# Patient Record
Sex: FEMALE | NOT HISPANIC OR LATINO | Employment: FULL TIME | URBAN - METROPOLITAN AREA
[De-identification: names, ages, dates, MRNs, and addresses within clinical notes are randomized per-mention and may not be internally consistent; named-entity substitution may affect disease eponyms.]

---

## 2022-09-20 ENCOUNTER — PATIENT OUTREACH (OUTPATIENT)
Dept: ONCOLOGY | Facility: CLINIC | Age: 67
End: 2022-09-20

## 2022-09-20 ENCOUNTER — TRANSCRIBE ORDERS (OUTPATIENT)
Dept: OTHER | Age: 67
End: 2022-09-20

## 2022-09-20 DIAGNOSIS — C55 MALIGNANT NEOPLASM OF UTERUS (H): Primary | ICD-10-CM

## 2022-09-20 NOTE — PROGRESS NOTES
"New Patient Hematology / Oncology Nurse Navigator Note     Referral Date: 9/20/22    Referring provider:   Tamy Syed MD    [address] [facility] [address]    [address]    [place] [address]    Phone: [phone]    Fax: [phone]      Referring Clinic/Organization: [facility]      Referred to: GynOnc    Requested provider (if applicable): First available - did not specify     Evaluation for : Endometrial cancer FIGO grade 1     Clinical History (per Nurse review of records provided):      9/19 Office Visit OBGYN:  \"A/p; post op check, new diagnosis of endometrial adenocarcinoma  Healing is going well, continue lifting precautions but may be more active as time goes on  Referral placed for GYN Oncology. Care involves removal of cervix and ovaries for complete staging and treatment. This would invariably involve removal of her mesh. Timing of surgery and risk for recurrence is difficult to tell given her unique situation, but data may be available that I am unaware of regarding this.   Follow up as needed. \" -- BOOKMARKED     9/2 path showing:  Final Diagnosis     A. CERVIX, BIOPSY  --INFLAMED ENDOCERVICAL POLYP     B. UTERUS AND FALLOPIAN TUBES, SUPRACERVICAL HYSTERECTOMY AND BILATERAL SALPINGECTOMY  --ENDOMETRIOID ADENOCARCINOMA, FIGO GRADE 1, INVADING 0.2 CM INTO A 1.1 CM MYOMETRIUM (LESS THAN 50%)  --FALLOPIAN TUBES WITH NO DIAGNOSTIC ABNORMALITY  --BENIGN BILATERAL FALLOPIAN TUBES  --SEE COMMENT   -- BOOKMARKED    2/26/20 PAP/HPV -- BOOKMARKED    Clinical Assessment / Barriers to Care (Per Nurse):  Pt lives in [facility]    Records Location: Care Everywhere     Records Needed:   Imaging, path from [facility]    Additional testing needed prior to consult:   N/A    Referral updates and Plan:   OUTGOING CALL to pt:  Introduced my role as nurse navigator with [facility] Hematology/Oncology dept and that we have recd the referral for dx of Endometrial Cancer from Dr Syed  Pt confirms they are aware of " the referral and ready to schedule  Provided contact information if future questions arise.     -Transferred pt to NPS line [phone] to schedule appt per scheduling instructions.    Hold 9/26 Dr. Richards 8AM @     Carmencita Pate, BARRYN, RN, PHN, OCN  Hematology/Oncology Nurse Navigator  [facility]  [phone]

## 2022-09-22 NOTE — PROGRESS NOTES
RECORDS STATUS - ALL OTHER DIAGNOSIS    Malignant neoplasm of uterus   RECORDS RECEIVED FROM: [facility]   DATE RECEIVED: 9/26/2022     Action    Action Taken 9/22/2022 5:42pm [facility]     I called [facility]   Ph: [phone]     9/23/2022 8:30AM [facility]   I called [facility]   Ph: [phone] Fax: [phone]      NOTES STATUS DETAILS   OFFICE NOTE from referring provider Complete [facility]   ref Dr. Syed   DISCHARGE SUMMARY from hospital     DISCHARGE REPORT from the ER     OPERATIVE REPORT Complete See biopsy Report in /[facility]   CLINICAL TRIAL TREATMENTS TO DATE     LABS     PATHOLOGY REPORTS Requested- [facility]   [employer] Tracking Number: [id number]  9/2/2022   Case [id number]  A. CERVIX, BIOPSY  --INFLAMED ENDOCERVICAL POLYP     B. UTERUS AND FALLOPIAN TUBES, SUPRACERVICAL HYSTERECTOMY AND BILATERAL SALPINGECTOMY  --ENDOMETRIOID ADENOCARCINOMA   ANYTHING RELATED TO DIAGNOSIS Complete Labs last updated on 9/3/2022   GENONOMIC TESTING     TYPE:     IMAGING (NEED IMAGES & REPORT)     CT SCANS     MRI     MAMMO     [facility] 12/2/2015     US transvaginal 12/2/2015   PET

## 2022-09-26 ENCOUNTER — ONCOLOGY VISIT (OUTPATIENT)
Dept: ONCOLOGY | Facility: CLINIC | Age: 67
End: 2022-09-26
Attending: OBSTETRICS & GYNECOLOGY
Payer: COMMERCIAL

## 2022-09-26 ENCOUNTER — PATIENT OUTREACH (OUTPATIENT)
Dept: ONCOLOGY | Facility: CLINIC | Age: 67
End: 2022-09-26

## 2022-09-26 ENCOUNTER — PRE VISIT (OUTPATIENT)
Dept: ONCOLOGY | Facility: CLINIC | Age: 67
End: 2022-09-26

## 2022-09-26 VITALS
HEART RATE: 77 BPM | SYSTOLIC BLOOD PRESSURE: 141 MMHG | DIASTOLIC BLOOD PRESSURE: 87 MMHG | HEIGHT: 65 IN | OXYGEN SATURATION: 100 % | BODY MASS INDEX: 25.33 KG/M2 | WEIGHT: 152 LBS

## 2022-09-26 DIAGNOSIS — C54.1 ENDOMETRIAL CANCER (H): Primary | ICD-10-CM

## 2022-09-26 PROBLEM — N81.4 CYSTOCELE WITH PROLAPSE: Status: ACTIVE | Noted: 2022-04-29

## 2022-09-26 PROBLEM — M19.90 ARTHRITIS: Status: ACTIVE | Noted: 2018-02-05

## 2022-09-26 PROBLEM — N81.9 PELVIC PROLAPSE: Status: ACTIVE | Noted: 2022-09-02

## 2022-09-26 PROBLEM — H25.21 MORGAGNIAN AGE-RELATED CATARACT OF RIGHT EYE: Status: ACTIVE | Noted: 2021-01-26

## 2022-09-26 PROBLEM — N60.19 DIFFUSE CYSTIC MASTOPATHY: Status: ACTIVE | Noted: 2022-09-26

## 2022-09-26 PROBLEM — H25.13 AGE-RELATED NUCLEAR CATARACT OF BOTH EYES: Status: ACTIVE | Noted: 2021-02-11

## 2022-09-26 PROBLEM — E78.5 HYPERLIPIDEMIA: Status: ACTIVE | Noted: 2022-09-26

## 2022-09-26 PROCEDURE — 99205 OFFICE O/P NEW HI 60 MIN: CPT | Performed by: OBSTETRICS & GYNECOLOGY

## 2022-09-26 RX ORDER — ASCORBIC ACID 100 MG
1 TABLET,CHEWABLE ORAL DAILY
COMMUNITY

## 2022-09-26 RX ORDER — ROSUVASTATIN CALCIUM 5 MG/1
5 TABLET, COATED ORAL
COMMUNITY
Start: 2022-02-21 | End: 2023-02-21

## 2022-09-26 RX ORDER — HEPARIN SODIUM 10000 [USP'U]/ML
5000 INJECTION, SOLUTION INTRAVENOUS; SUBCUTANEOUS
Status: CANCELLED | OUTPATIENT
Start: 2022-09-26

## 2022-09-26 RX ORDER — METRONIDAZOLE 500 MG/100ML
500 INJECTION, SOLUTION INTRAVENOUS
Status: CANCELLED | OUTPATIENT
Start: 2022-09-26

## 2022-09-26 RX ORDER — IBUPROFEN 800 MG/1
800 TABLET, FILM COATED ORAL EVERY 8 HOURS
COMMUNITY
Start: 2022-09-03

## 2022-09-26 RX ORDER — ASCORBIC ACID 500 MG
1000 TABLET ORAL
COMMUNITY

## 2022-09-26 RX ORDER — ACETAMINOPHEN 500 MG
1000 TABLET ORAL
COMMUNITY
Start: 2022-09-03

## 2022-09-26 RX ORDER — CHLORAL HYDRATE 500 MG
1 CAPSULE ORAL DAILY
COMMUNITY

## 2022-09-26 RX ORDER — AMOXICILLIN 250 MG
2 CAPSULE ORAL
COMMUNITY
Start: 2022-09-04 | End: 2022-10-26

## 2022-09-26 ASSESSMENT — PAIN SCALES - GENERAL: PAINLEVEL: NO PAIN (0)

## 2022-09-26 NOTE — PROGRESS NOTES
[facility], Gynecologic Oncology:  Pre-op Education Note    Relevant Diagnosis:  Endometrial cancer    Procedure:  Robotic-assisted laparoscopic trachelectomy, bilateral oophorectomy; likely combo case with UroGyn per Dr. Patel).    Procedure Date: TBD    Person(s) involved in teaching:  Patient and     Education was completed: in person.    Motivation Level:    Asks Questions:   Yes  Eager to Learn:  Yes  Cooperative:  Yes  Receptive (willing/able to accept information):  Yes  Comments:  None    Patient demonstrates understanding of the following:  Reason for the appointment, diagnosis and treatment plan:  Yes  Knowledge of proper use of medications and conditions for which they are ordered (with special attention to potential side effects or drug interactions):  Yes  Which situations necessitate calling provider and whom to contact:  Yes    Teaching Concerns:  No    Education/Instructional Materials Used/Given:     Before Your Surgery Booklet ([place])  Showering Before Surgery, CHG prep provided  Home Care After Gynecologic Surgery  [facility]  Accommodations Brochure   Phone numbers for [facility] and After-Hours Line    Pre-op tests:     EKG: Not needed, per Dr. Patel.    Labs: Not needed, per Dr. Patel.    Chest X-Ray: Not needed, per Dr. Patel.    COVID-19 Testing: Patient will plan to complete rapid test (antigen test) at home 1-2 days prior to surgery, and was given the instructions for this.    Other: N/A    Pre-op appointment: Dr. Patel will complete patient's pre-op H&P exam.    Post-op appointment: ~1-2 weeks after surgery; appointment will be scheduled once a surgery date has been confirmed.    Time spent teaching with patient:  25 minutes    Hysterectomy Acknowledgement Statement form signed today: N/A - not required.    E-Consent for surgery signed today: Yes.    E-Consent for possible blood transfusion signed today:  Yes.    Surgery scheduling team at [facility] was notified, and will be contacting patient directly to schedule her surgery.    Fox Zhang, RN, BSN, OCN  RN Care Coordinator - Oncology  [facility]

## 2022-09-26 NOTE — NURSING NOTE
"Oncology Rooming Note    September 26, 2022 8:04 AM   Carole Grover is a 67 year old female who presents for:    Chief Complaint   Patient presents with     Oncology Clinic Visit     New Malignant Neoplasm of Uterus     Initial Vitals: BP (!) 141/87 (BP Location: Right arm, Patient Position: Chair, Cuff Size: Adult Regular)   Pulse 77   Ht 1.651 m (5' 5\")   Wt 68.9 kg (152 lb)   SpO2 100%   BMI 25.29 kg/m   Estimated body mass index is 25.29 kg/m  as calculated from the following:    Height as of this encounter: 1.651 m (5' 5\").    Weight as of this encounter: 68.9 kg (152 lb). Body surface area is 1.78 meters squared.  No Pain (0) Comment: Data Unavailable   No LMP recorded.  Allergies reviewed: Yes  Medications reviewed: Yes    Medications: Medication refills not needed today.  Pharmacy name entered into EPIC: [id number] - [place], [place] - [address] [place] [address]    Clinical concerns: New Patient     Dr. Patel was notified.      Selma Higgins CMA              "

## 2022-09-26 NOTE — PROGRESS NOTES
Gynecologic Oncology Clinic - New Patient    Referring provider:    Tamy Syed  [facility]  [address] [place] [address]    Patient: Carole Grover  : 1955    Date of Visit: Sep 26, 2022     Reason for visit: endometrial cancer    History of Present Illness:  Carole Grover is a 67 year old post-menopausal female who underwent robotic supracervical hysterectomy, salpingectomy with sacral colpopexy with incidental finding of endometrial cancer on final pathology on 2022. Specimen was removed intact.    No prior history of abnormal uterine bleeding. Body mass index is 25.29 kg/m . Family history notable for brother with bladder cancer and sister with very early onset breast cancer (20s).        Review of Systems:  As per HPI, otherwise non-contributory.     OB/Gynecologic History:  ,  x 3    Pap smear: no history of abnormal    Past Medical History:   Diagnosis Date     High cholesterol        Past Surgical History:   Procedure Laterality Date     APPENDECTOMY       CATARACT EXTRACTION Bilateral         Social History     Tobacco Use     Smoking status: Never Smoker     Smokeless tobacco: Never Used   Substance Use Topics     Alcohol use: Yes     Drug use: Never        Family History   Problem Relation Age of Onset     Cervical Cancer Sister      Breast Cancer Sister 20     Bladder Cancer Brother        Current Outpatient Medications   Medication Sig Dispense Refill     acetaminophen (TYLENOL) 500 MG tablet Take 1,000 mg by mouth       fish oil-omega-3 fatty acids 1000 MG capsule Take 1 capsule by mouth daily       ibuprofen (ADVIL/MOTRIN) 800 MG tablet Take 800 mg by mouth every 8 hours       Multiple Vitamin (QUINTABS) TABS Take 1 tablet by mouth daily       rosuvastatin (CRESTOR) 5 MG tablet Take 5 mg by mouth       senna-docusate (SENOKOT-S/PERICOLACE) 8.6-50 MG tablet Take 2 tablets by mouth       UNABLE TO FIND Take 1 capsule by mouth Osteomatrix       vitamin C  "(ASCORBIC ACID) 500 MG tablet Take 1,000 mg by mouth       vitamin D3 (CHOLECALCIFEROL) 125 MCG (5000 UT) tablet Take 5,000 Units by mouth          Allergies   Allergen Reactions     Penicillins Hives     Keflex [Cephalexin] Rash         Physical Exam:   BP (!) 141/87 (BP Location: Right arm, Patient Position: Chair, Cuff Size: Adult Regular)   Pulse 77   Ht 1.651 m (5' 5\")   Wt 68.9 kg (152 lb)   SpO2 100%   BMI 25.29 kg/m    General appearance: Alert and oriented, no acute distress, well groomed  CV: regular rate, regular rhythm  Resp: Lungs clear to auscultation bilaterally. Normal respiratory effort.  Skin: no skin changes or lesions      Labs/Pathology:   Reviewed her labs from 9/3/2022 Hgb and type and screen  8/29/22: CBC, BMP no concerns; EKG NSR    Final Diagnosis     A. CERVIX, BIOPSY  --INFLAMED ENDOCERVICAL POLYP     B. UTERUS AND FALLOPIAN TUBES, SUPRACERVICAL HYSTERECTOMY AND BILATERAL SALPINGECTOMY  --ENDOMETRIOID ADENOCARCINOMA, FIGO GRADE 1, INVADING 0.2 CM INTO A 1.1 CM MYOMETRIUM (LESS THAN 50%)  --FALLOPIAN TUBES WITH NO DIAGNOSTIC ABNORMALITY  --BENIGN BILATERAL FALLOPIAN TUBES  --SEE COMMENT     [id number]   Electronically signed by Lyla Vazquez MD on 9/13/2022 at  2:37 PM   Comment     This case was seen in consultation with [employer] (Case Duran M.D., Ph.D.) and the diagnosis reflects the consultant's interpretation.     Synoptic Checklist  ENDOMETRIUM   ENDOMETRIUM: HYSTERECTOMY - All Specimens   8th Edition - Protocol posted: 6/22/2022     SPECIMEN      Procedure:    Supracervical hysterectomy     TUMOR      Tumor Site:    Endometrium      Histologic Type:    Endometrioid carcinoma, NOS      Histologic Grade:    FIGO grade 1      Myometrial Invasion:    Present        Depth of Myometrial Invasion:    2 mm        Myometrial Thickness:    11 mm        Percentage of Myometrial Invasion:    Estimated to be less than 50%      Uterine Serosa Involvement: " "   Not identified      Cervical Stromal Involvement:    Not identified      Other Tissue / Organ Involvement:    Not identified      Peritoneal / Ascitic Fluid:    Not submitted / unknown      Lymphovascular Invasion (LVI):    Not identified     MARGINS      Margin Status:    All margins negative for invasive carcinoma     REGIONAL LYMPH NODES      Regional Lymph Node Status:    Not applicable (no regional lymph nodes submitted or found)     DISTANT METASTASIS      Distant Site(s) Involved:    Not applicable     PATHOLOGIC STAGE CLASSIFICATION (pTNM, AJCC 8th Edition)      Reporting of pT, pN, and (when applicable) pM categories is based on information available to the pathologist at the time the report is issued. As per the AJCC (Chapter 1, 8th Ed.) it is the managing physician's responsibility to establish the final pathologic stage based upon all pertinent information, including but potentially not limited to this pathology report.      TNM Descriptors:    Not applicable      pT Category:    pT1a      Regional Lymph Nodes Modifier:    Not applicable      pN Category:    pN not assigned (no nodes submitted or found)      pM Category:    Not applicable - pM cannot be determined from the submitted specimen(s)     FIGO STAGE      FIGO Stage:    IA    Clinical Information     Pre-op diagnosis:  Cystocele with prolapse [N81.4]   Gross Description        A. The specimen in a container labeled \"cervix, polyp\" consists of a 1.2 x 0.5 x 0.4 cm tan-pink soft tissue fragment. It is submitted in A1.          B. The specimen in a container labeled \"uterus and fallopian tubes, bilateral\" consists of a 29 gram supracervical hysterectomy specimen measuring 5.6 cm cornu to cornu by 3.2 cm fundus to resection margin by 1.8 cm anterior to posterior. The serosa is tan-pink, smooth and glistening. The endometrial cavity is 2.4 x 2.4 cm and has tan-pink focally hemorrhagic endometrium with a 0.5 x 0.4 x 0.3 cm polyp. The myometrium is " tan-pink and mildly trabeculated with a thickness of 1.1 cm. No discrete lesions or masses are identified within the uterus. Separately received within the container are two fimbriated fallopian tubes measuring 5.5 cm and 5.9 cm in length by 0.5 cm each in diameter. The external surface of both fallopian tubes is tan-pink to purple, smooth and glistening. Sectioning the fallopian tubes reveals tan-pink cut surfaces with a stellate lumen. No discrete lesions or masses are identified. Representative sections are submitted: B1, margin to include endometrial polyp; B2, full thickness endomyometrium; B3, one fallopian tube to include entire fimbriae; B4, other fallopian tube to include entire fimbriae, B5-7 remainder of endometrium. (KS)             Imaging:   n/a      Assessment:  Carole Grover is a 67 year old post-menopausal female s/p robotic supracervical hysterectomy, salpingectomy with sacral colpopexy with incidental finding of a [id number] Grade 1 endometrial cancer on final pathology on 9/2/2022. No MMR testing has been completed.    Plan:   1. Endometrial cancer with cervix and ovaries in situ: we reviewed her pathology which shows an apparent early stage low grade tumor incidentally noted on pathology. We discussed the concern with cervix in situ that there could be some endometrial tissue remaining. Retrospective studies have pretty consistently shown a decreased cancer specific survival in patients who underwent supracervical hysterectomy as compared to matched controls who underwent total hysterectomy. Given this data, I would recommend completion surgery to include removal of the cervix (trachelectomy) with bilateral oophorectomy. The tumor in her uterus did not meet criteria for lymph node evaluation based upon Oh criteria, thus I feel the risk of occult lymphatic metastases is low and I would not recommend lymphadenectomy. We discussed that most likely the remaining cervix and ovaries do not contain  cancer, but the recommendation for removal is to both confirm this and also to decrease the chance of cancer returning in the future, particularly in the case of the cervix as the distinction between the cervix and the uterus is nebulous and thus it is common for endometrial tissue to remain when the cervix is still in situ.   2. Genetics: We will need to request MMR testing on her prior hysterectomy specimen from [facility]. She does not have the typical risk factors for an endometrial cancer diagnosis. No strong family history of Walsh associated cancers, although she does have a brother with h/o bladder cancer  3. History of apical/pelvic organ prolapse: we discussed that removal of the cervix unfortuately will necessitate disruption/removal of at least a portion of the mesh used for her repair and that would almost certainly result in re-occurrence of her prolapse. Thus I would recommend she connect with one of our Urology colleagues to discuss options for resuspension of the vaginal apex at the conclusion of the case.   4. I will plan to book her case robotically if possible given the risk of challenging re-excision of the mesh. I also strongly recommend we wait 6 weeks at a minimum prior to pursuing her next surgery as this decreases the inflammation and scar tissue from her prior surgery thus decreasing the risks of complications.  5. We reviewed the risks/benefits of this procedure. Alternatives would be observation with sampling of the remaining cervix at regular intervals. Technically radiation to the residual cervix is also an option as that has been shown to approximate total hysterectomy recurrence rates, however, in her case I think the risks of surgery are lower than the risks of radiation thus I would recommend that over radiation as an option.   6. I have sent a message to Dr. Hanks and her team. We will attempt to proceed with surgery in the next month.  7. Pre-op: She underwent pre-op exam prior to  hysterectomy and she has had no significant changes to her health status save for this diagnosis. Thus I do not see the utility in additional pre-op appointment. I will update the H&P day of surgery if needed based upon surgical timing.     I spent a total of 75 minutes on the care of Carole Grover on the day of service including 55 minutes face to face time and the remainder in chart review, care coordination, and documentation on the day of service. I discussed her case with Dr. Hanks in Urology. I reviewed the documention from Dr. Syed and from Urology.    Hayden Patel MD    Division of Gynecologic Oncology  Department of Ob/Gyn and Women's Health  [facility]

## 2022-09-28 NOTE — TELEPHONE ENCOUNTER
MEDICAL RECORDS REQUEST   [facility]  Urology Clinic  [address] [place] [address]  PHONE: [phone]  Fax: [phone]        FUTURE VISIT INFORMATION                                                   Carole Grover, : 1955 scheduled for future visit at [facility]    APPOINTMENT INFORMATION:    Date: 2022    Provider:  Tiffani Hanks MD    Reason for Visit/Diagnosis: Discuss surgery, combo surgery Dr Patel      RECORDS REQUESTED FOR VISIT                                                     NOTES  STATUS/DETAILS   OFFICE NOTE from other specialist  yes, 2022 -- Hayden Patel MD @ [facility]    2022, 2022 -- Dr Tamy Syed @  [facility]    2022 -- Anna Jaramillo MD @ [facility]   OPERATIVE REPORT  yes, 2022 - Madhuri Olson MD @ [facility]  2022 -- Tamy Syed MD @ [facility]   MEDICATION LIST  yes   LABS     URINALYSIS (UA)  no   IMAGING (IMAGES & REPORT)  no     PRE-VISIT CHECKLIST      Record collection complete Yes   Appointment appropriately scheduled           (right time/right provider) Yes   Joint diagnostic appointment coordinated correctly          (ensure right order & amount of time) Yes   MyChart activation Yes   Questionnaire complete If no, please explain pending

## 2022-09-29 ENCOUNTER — VIRTUAL VISIT (OUTPATIENT)
Dept: UROLOGY | Facility: CLINIC | Age: 67
End: 2022-09-29
Payer: COMMERCIAL

## 2022-09-29 ENCOUNTER — PRE VISIT (OUTPATIENT)
Dept: UROLOGY | Facility: CLINIC | Age: 67
End: 2022-09-29

## 2022-09-29 DIAGNOSIS — C54.1 ENDOMETRIAL CANCER (H): ICD-10-CM

## 2022-09-29 DIAGNOSIS — Z98.890 HISTORY OF SACROCOLPOPEXY: Primary | ICD-10-CM

## 2022-09-29 PROCEDURE — 99204 OFFICE O/P NEW MOD 45 MIN: CPT | Mod: GT | Performed by: UROLOGY

## 2022-09-29 NOTE — PATIENT INSTRUCTIONS
[facility] RN Care Coordinator [phone]    Surgery scheduling [phone]    It was a pleasure meeting with you today.  Thank you for allowing me and my team the privilege of caring for you today.  YOU are the reason we are here, and I truly hope we provided you with the excellent service you deserve.  Please let us know if there is anything else we can do for you so that we can be sure you are leaving completely satisfied with your care experience.

## 2022-09-29 NOTE — Clinical Note
Surgery with Maurice, patient needs 4 week postop in person (can be with SHANICE).  Also needs bowel prep prior to surgery

## 2022-09-29 NOTE — PROGRESS NOTES
September 29, 2022    Referring Provider: Hayden Patel MD  [address] [place] [address]    Primary Care Provider: Anna Jaramillo    Assessment & Plan     History of sacrocolpopexy  Discussed with patient that we would have to remove some of the mesh with the cervix.  We discussed that if the surgery went really well without any complications that I could plan on re-suspending the vaginal cuff with mesh.     We discussed that I do not plan on removing all of the existing mesh but that if possible would plan on adding a new piece of mesh to the vagina and then attaching this to the mesh that is in situ.  We discussed if there were any complications, specifically injury to bowel or the urinary tract that I would NOT place mesh and instead do a uterosacral suspension with the understanding that if needed we may be able to return for another surgery.      We also discussed that a trachelectomy after sacrocolpopexy can be challenging with increased risks for injury to bowel or urinary tract so I recommend a preoperative bowel prep and that we place temporary ureteral stents at the beginning of the case to assist us in identification of the ureters    Endometrial cancer (H)  She is going to be undergoing trachelctomy and BSO.  Based on the final pathology given the likelihood of needing pelvic radiation in the future is extremely low, will consider revising the sacrocolpopexy at the time of her cancer surgery    We discussed that the risks to the procedure include but not limited to cardiovascular risks of anesthesia, nerve injury, bleeding, infection, injury to adjacent organs including bowel, bladder, and blood vessels, conversion to open procedure, de abhishek or worsening stress incontinence or urge incontinence, need for post-operative waddell catheter, need for further procedures including for mesh extrusion or erosion.  Patient expressed understanding and agreeable to proceed with CYSTOSCOPY WITH  BILATERAL TEMPORARY URETERAL STENTS, ROBOTIC ASSISTED LAPAROSCOPIC SACRCOCOLPOPEXY MESH REVISION VERSUS REMOVAL, POSSIBLE UTEROSACRAL LIGAMENT SUSPENSION, POSSIBLE OPEN    Discussed with patient that this case is tentatively scheduled for 10/28    22 minutes were spent on this day of the encounter in reviewing the EMR including reviewing Dr Syed's notes, Dr Olson's notes, Dr Richards's note, direct patient care including surgical counseling, coordination of care and documentation    Tiffani Hanks MD MPH  (she/her/hers)   of Urology  [facility]      HPI:  Carole Grover is a 67 year old female who presents for evaluation of her pelvic floor symptoms.  She underwent a supracervical hysterectomy and sacrocolpopexy in 9/2/22 at [facility] with pathology returning with grade 1 Endometroid adenocarcinoma.  Operative notes from Dr Syed (OBGYN) and Dr Olson (Urology) reviewed in CareEverywhere from 9/2/22.  She was thus referred to Dr Richards who is planning on definitive surgery for the cancer (trachelectomy and BSO) and has asked patient to see me to discuss the sacrocolpopexy (Dr Richards's note from 9/26/22 reviewed)    Patient has had not issues from the sacrocolpoexy.  Denies gross hematuria, vaginal bleeding, UTIs, urinary incontinence, constipation, or pain.    Past Medical History:   Diagnosis Date     High cholesterol        Past Surgical History:   Procedure Laterality Date     APPENDECTOMY       CATARACT EXTRACTION Bilateral      Social History     Socioeconomic History     Marital status:      Spouse name: Not on file     Number of children: Not on file     Years of education: Not on file     Highest education level: Not on file   Occupational History     Not on file   Tobacco Use     Smoking status: Never Smoker     Smokeless tobacco: Never Used   Substance and Sexual Activity     Alcohol use: Yes     Drug use: Never     Sexual activity: Not on file   Other Topics Concern      Not on file   Social History Narrative     Not on file     Social Determinants of Health     Financial Resource Strain: Not on file   Food Insecurity: Not on file   Transportation Needs: Not on file   Physical Activity: Not on file   Stress: Not on file   Social Connections: Not on file   Intimate Partner Violence: Not on file   Housing Stability: Not on file       Family History   Problem Relation Age of Onset     Cervical Cancer Sister      Breast Cancer Sister 20     Bladder Cancer Brother        ROS    Allergies   Allergen Reactions     Penicillins Hives     Keflex [Cephalexin] Rash       Current Outpatient Medications   Medication     acetaminophen (TYLENOL) 500 MG tablet     rosuvastatin (CRESTOR) 5 MG tablet     fish oil-omega-3 fatty acids 1000 MG capsule     ibuprofen (ADVIL/MOTRIN) 800 MG tablet     Multiple Vitamin (QUINTABS) TABS     senna-docusate (SENOKOT-S/PERICOLACE) 8.6-50 MG tablet     UNABLE TO FIND     vitamin C (ASCORBIC ACID) 500 MG tablet     vitamin D3 (CHOLECALCIFEROL) 125 MCG (5000 UT) tablet     No current facility-administered medications for this visit.   There were no vitals taken for this visit.  GENERAL: healthy, alert and no distress  EYES: Eyes grossly normal to inspection, conjunctivae and sclerae normal  HENT: normal cephalic/atraumatic.  External ears, nose and mouth without ulcers or lesions.  RESP: no audible wheeze, cough, or visible cyanosis.  No visible retractions or increased work of breathing.  Able to speak fully in complete sentences.  NEURO: Cranial nerves grossly intact, mentation intact and speech normal  PSYCH: mentation appears normal, affect normal/bright, judgement and insight intact, normal speech and appearance well-groomed    CC  Patient Care Team:  Anna Jaramillo as PCP - General (Family Medicine)  Hayden Patel MD as MD (Gynecologic Oncology)  Fox Zhang, RN as Specialty Care Coordinator (Hematology & Oncology)  HAYDEN PATEL

## 2022-09-29 NOTE — NURSING NOTE
Patient denies any changes since echeck-in regarding medication and allergies.Patient also states all information entered during echeck-in remains accurate.    Patient states they are in [place] for today's virtual visit.     Alona Quezada, Virtual Visit [facility]

## 2022-09-29 NOTE — PROGRESS NOTES
Elzbieta is a 67 year old who is being evaluated via a billable video visit.      How would you like to obtain your AVS? Group [facility]be Ingenicahart  If the video visit is dropped, the invitation should be resent by: Text to cell phone: [phone]  Will anyone else be joining your video visit? No        Video-Visit Details    Video Start Time: 12:53 PM    Type of service:  Video Visit    Video End Time:1:08 PM    Originating Location (pt. Location): Home    Distant Location (provider location):  [facility] UROLOGY [facility]     Platform used for Video Visit: Rayku

## 2022-09-30 NOTE — TELEPHONE ENCOUNTER
Records received 09/30/22    Facility  [facility] LABORATORY SERVICES - [facility]  [address]   [place] [address]  Phone: [phone]   Outcome Received pathology - sent for consult   9/2/22 [id number]

## 2022-10-03 ENCOUNTER — LAB REQUISITION (OUTPATIENT)
Dept: LAB | Facility: CLINIC | Age: 67
End: 2022-10-03
Payer: COMMERCIAL

## 2022-10-03 ENCOUNTER — HEALTH MAINTENANCE LETTER (OUTPATIENT)
Age: 67
End: 2022-10-03

## 2022-10-03 PROCEDURE — 88321 CONSLTJ&REPRT SLD PREP ELSWR: CPT | Mod: GC | Performed by: PATHOLOGY

## 2022-10-03 PROCEDURE — 88341 IMHCHEM/IMCYTCHM EA ADD ANTB: CPT | Mod: 26 | Performed by: PATHOLOGY

## 2022-10-03 PROCEDURE — 88342 IMHCHEM/IMCYTCHM 1ST ANTB: CPT | Mod: 26 | Performed by: PATHOLOGY

## 2022-10-03 PROCEDURE — 88342 IMHCHEM/IMCYTCHM 1ST ANTB: CPT | Mod: TC | Performed by: OBSTETRICS & GYNECOLOGY

## 2022-10-05 ENCOUNTER — TELEPHONE (OUTPATIENT)
Dept: ONCOLOGY | Facility: CLINIC | Age: 67
End: 2022-10-05

## 2022-10-05 NOTE — TELEPHONE ENCOUNTER
Spoke with patient regarding scheduled surgery with Dr. Patel and Dr. Hanks.    She has a PCR test scheduled on 10/24 at [facility]. Fax # for COVID results provided.    Confirmed all scheduled information.   __    Makayla Vergara on 10/5/2022  P: [phone]

## 2022-10-05 NOTE — TELEPHONE ENCOUNTER
RN Care Coordinator: Fox Zhang; [phone]    Surgery is scheduled with Dr. Patel and Dr. Hanks   Date: 10/28   Location: [facility]  Scheduled per next available date      H&P already completed by Surgeon     COVID-19 test: patient to complete an at-home test 1-2 days prior to scheduled date and bring a picture of negative results or call [phone] option # 7 to schedule a PCR test within 4 days of the scheduled date     Post-op: will be scheduled by the clinic    Patient will receive a phone call from pre-admission nurses 1-2 days prior to surgery with arrival and start time.      Called patient but voicemail was full. Will send [place] message as well.      Patient questions/concerns: N/A no action needed      Surgery packet was provided by the RNCC during appointment    __    Makayla Vergara on 10/5/2022  P: [phone]

## 2022-10-06 ENCOUNTER — PATIENT OUTREACH (OUTPATIENT)
Dept: ONCOLOGY | Facility: CLINIC | Age: 67
End: 2022-10-06

## 2022-10-06 NOTE — PROGRESS NOTES
[facility]: Form or Letter Request Documentation    Date Received in Clinic:  9/26/22  Mode Received:  Patient Drop Off  Name/Type of Form or Letter: FMLA (continuous leave)  Completion Date: 10/6/2022  Copy Mailed to patient: Yes on 10/6/2022  Disposition of Form or Letter:  Faxed to [facility] Department on 10/6/2022 (fax number: [phone]).  Original form was then labeled and sent to [facility] for scanning.  Notes: Patient states she is still on a leave of absence from her previous surgery on 9/2/22.  She is requesting to extend her leave for another 8 weeks past the date of her upcoming surgery (scheduled for 10/28/22).    Fox Zhang, RN, BSN, OCN  RN Care Coordinator - Oncology  [facility]

## 2022-10-07 LAB
LAB DIRECTOR COMMENTS: NORMAL
LAB DIRECTOR DISCLAIMER: NORMAL
LAB DIRECTOR INTERPRETATION: NORMAL
LAB DIRECTOR METHODOLOGY: NORMAL
LAB DIRECTOR RESULTS: NORMAL
SPECIMEN DESCRIPTION: NORMAL

## 2022-10-10 PROCEDURE — 81288 MLH1 GENE: CPT | Performed by: OBSTETRICS & GYNECOLOGY

## 2022-10-10 PROCEDURE — G0452 MOLECULAR PATHOLOGY INTERPR: HCPCS | Mod: 26 | Performed by: STUDENT IN AN ORGANIZED HEALTH CARE EDUCATION/TRAINING PROGRAM

## 2022-10-11 LAB
PATH REPORT.ADDENDUM SPEC: ABNORMAL
PATH REPORT.COMMENTS IMP SPEC: ABNORMAL
PATH REPORT.COMMENTS IMP SPEC: YES
PATH REPORT.FINAL DX SPEC: ABNORMAL
PATH REPORT.GROSS SPEC: ABNORMAL
PATH REPORT.MICROSCOPIC SPEC OTHER STN: ABNORMAL
PATH REPORT.RELEVANT HX SPEC: ABNORMAL
PATH REPORT.RELEVANT HX SPEC: ABNORMAL
PATH REPORT.SITE OF ORIGIN SPEC: ABNORMAL

## 2022-10-24 ENCOUNTER — PATIENT OUTREACH (OUTPATIENT)
Dept: UROLOGY | Facility: CLINIC | Age: 67
End: 2022-10-24

## 2022-10-24 NOTE — TELEPHONE ENCOUNTER
Left message to see if the patient has any questions about her up coming surgery with Dr Hanks. This writer wanted this patient to get an urinalysis today or tomorrow closer to home  Nova Del Rosario RN, BSN  Care Coordinator Urology

## 2022-10-25 DIAGNOSIS — N39.0 URINARY TRACT INFECTION: Primary | ICD-10-CM

## 2022-10-26 ENCOUNTER — TELEPHONE (OUTPATIENT)
Dept: UROLOGY | Facility: CLINIC | Age: 67
End: 2022-10-26

## 2022-10-26 NOTE — TELEPHONE ENCOUNTER
Notified patient of her results UA from yesterday:                                                                                            VALUE  Color,                    Urine Yellow                                       Yellow  Clarity,                  Urine Clear                                          Clear   Specific Gravity,   Urine 1.005 -  1.030                            1.015   Glucose,Urine      Negative                                              Negative   Bilirubin, Urine      Negative                                              Negative   Ketone,  Urine      Negative                                              Negative   Blood, Urine         Negative                                              Negative   pH, Urine              5.0 - 8.5 pH                                         7.0    Protein, Urine       Negative                                              Negative   Urobilinogen,Urine<2.0 EU/dL                                          0.2    Nitrite, Urine         Negative                                              Negative   Leukocyte Esterase, Urine Negative                               Negative   RBC, Urine None Seen, 1-2 /HPF                                  None Seen   WBC, UrineNone Seen, 1-5 /HPF                                  1-5   Bacteria, UrineNone Seen, Few /HPF                            None Seen   Squamous Epithelial Cells, UrineNone Seen,1-5 /HPF  1-5         Patient was given different bowel prep by Dr Patel a month ago.  One bottle 8.3 oz of miralax mixed with 64 oz of Gatorade. Ducolax will be used also. Patient will start 10/27/22 with clear liquids.   Patient will call if she has any questions  Covid test done on Monday at [facility]-Negative    Nova Del Rosario, RN, BSN  Care Coordinator Urology

## 2022-10-27 ENCOUNTER — ANESTHESIA EVENT (OUTPATIENT)
Dept: SURGERY | Facility: CLINIC | Age: 67
End: 2022-10-27
Payer: COMMERCIAL

## 2022-10-27 NOTE — ANESTHESIA PREPROCEDURE EVALUATION
Anesthesia Pre-Procedure Evaluation    Patient: Carole Grover   MRN: [id number] : 1955        Procedure : Procedure(s):  Robotic-assisted laparoscopic trachelectomy (removal of the cervix)  Bilateral oophorectomy  ROBOTIC ASSISTED LAPAROSCOPIC SACRCOCOLPOPEXY MESH REVISION VERSUS REMOVAL, POSSIBLE UTEROSACRAL LIGAMENT SUSPENSION, POSSIBLE OPEN          Past Medical History:   Diagnosis Date     High cholesterol       Past Surgical History:   Procedure Laterality Date     APPENDECTOMY       CATARACT EXTRACTION Bilateral       Allergies   Allergen Reactions     Penicillins Hives     Keflex [Cephalexin] Rash      Social History     Tobacco Use     Smoking status: Never     Smokeless tobacco: Never   Substance Use Topics     Alcohol use: Yes      Wt Readings from Last 1 Encounters:   22 68.9 kg (152 lb)        Anesthesia Evaluation   Pt has had prior anesthetic. Type: General.    No history of anesthetic complications       ROS/MED HX  ENT/Pulmonary:  - neg pulmonary ROS  (-) tobacco use, COPD, sleep apnea and vocal abnormalities   Neurologic:  - neg neurologic ROS   (+) no peripheral neuropathy  (-) no seizures, no Multiple Sclerosis and Dementia   Cardiovascular:     (+) Dyslipidemia -----Previous cardiac testing   Echo: Date: Results:    Stress Test: Date: Results:    ECG Reviewed: Date: 2022 Results:  NSR  Cath: Date: Results:   (-) CHF and stent   METS/Exercise Tolerance: >4 METS    Hematologic: Comments: Hgb 12.1   (-) anemia   Musculoskeletal:   (+) arthritis,     GI/Hepatic:  - neg GI/hepatic ROS     Renal/Genitourinary:  - neg Renal ROS     Endo:  - neg endo ROS     Psychiatric/Substance Use:     (+) psychiatric history depression     Infectious Disease:  - neg infectious disease ROS     Malignancy: Comment: endometroid ca  (+) Malignancy, GI CA  Active status post Surgery.        Other:            Physical Exam    Airway        Mallampati: II   TM distance: > 3 FB   Neck ROM: full   Mouth  opening: > 3 cm    Respiratory Devices and Support         Dental     Comment: Partial denture         Cardiovascular   cardiovascular exam normal          Pulmonary   pulmonary exam normal                OUTSIDE LABS:  CBC: No results found for: WBC, HGB, HCT, PLT  BMP: No results found for: NA, POTASSIUM, CHLORIDE, CO2, BUN, CR, GLC  COAGS: No results found for: PTT, INR, FIBR  POC: No results found for: BGM, HCG, HCGS  HEPATIC: No results found for: ALBUMIN, PROTTOTAL, ALT, AST, GGT, ALKPHOS, BILITOTAL, BILIDIRECT, JAMMIE  OTHER: No results found for: PH, LACT, A1C, MAHIN, PHOS, MAG, LIPASE, AMYLASE, TSH, T4, T3, CRP, SED    Anesthesia Plan    ASA Status:  3   NPO Status:  NPO Appropriate    Anesthesia Type: General.     - Airway: ETT   Induction: Intravenous, Propofol.   Maintenance: Balanced.   Techniques and Equipment:     - Lines/Monitors: BIS, 2nd IV (twitchview)     Consents    Anesthesia Plan(s) and associated risks, benefits, and realistic alternatives discussed. Questions answered and patient/representative(s) expressed understanding.     - Discussed: Risks, Benefits and Alternatives for BOTH SEDATION and the PROCEDURE were discussed     - Discussed with:  Patient      - Extended Intubation/Ventilatory Support Discussed: No.      - Patient is DNR/DNI Status: No    Use of blood products discussed: Yes.     - Discussed with: Patient.     Postoperative Care    Pain management: IV analgesics, Oral pain medications, Multi-modal analgesia, Peripheral nerve block (Single Shot).   PONV prophylaxis: Ondansetron (or other 5HT-3), Dexamethasone or Solumedrol     Comments:              57-year-old female with no cardiopulmonary disease and past medical history of endometrial cancer status post supracervical hysterectomy and sacrocolpopexy on 9/2/2022.  Biopsy of the report came back with endometrioid adenocarcinoma.  She is therefore here for definitive surgery, robot-assisted trachelectomy and bilateral  salpingo-oophorectomy.              Neftali Contreras MD

## 2022-10-28 ENCOUNTER — HOSPITAL ENCOUNTER (OUTPATIENT)
Facility: CLINIC | Age: 67
Discharge: HOME | End: 2022-10-28
Attending: OBSTETRICS & GYNECOLOGY | Admitting: OBSTETRICS & GYNECOLOGY
Payer: COMMERCIAL

## 2022-10-28 ENCOUNTER — ANESTHESIA (OUTPATIENT)
Dept: SURGERY | Facility: CLINIC | Age: 67
End: 2022-10-28
Payer: COMMERCIAL

## 2022-10-28 VITALS
TEMPERATURE: 97 F | WEIGHT: 143.3 LBS | HEIGHT: 65 IN | HEART RATE: 59 BPM | DIASTOLIC BLOOD PRESSURE: 54 MMHG | OXYGEN SATURATION: 100 % | RESPIRATION RATE: 16 BRPM | SYSTOLIC BLOOD PRESSURE: 117 MMHG | BODY MASS INDEX: 23.88 KG/M2

## 2022-10-28 DIAGNOSIS — Z98.890 HISTORY OF SACROCOLPOPEXY: ICD-10-CM

## 2022-10-28 DIAGNOSIS — G89.18 POST-OP PAIN: Primary | ICD-10-CM

## 2022-10-28 LAB
ABO/RH(D): NORMAL
ANTIBODY SCREEN: NEGATIVE
GLUCOSE BLDC GLUCOMTR-MCNC: 120 MG/DL (ref 70–99)
GLUCOSE BLDC GLUCOMTR-MCNC: 95 MG/DL (ref 70–99)
HGB BLD-MCNC: 12.1 G/DL (ref 11.7–15.7)
SPECIMEN EXPIRATION DATE: NORMAL

## 2022-10-28 PROCEDURE — 250N000011 HC RX IP 250 OP 636: Performed by: STUDENT IN AN ORGANIZED HEALTH CARE EDUCATION/TRAINING PROGRAM

## 2022-10-28 PROCEDURE — 52005 CYSTO W/URTRL CATHJ: CPT | Performed by: UROLOGY

## 2022-10-28 PROCEDURE — 272N000001 HC OR GENERAL SUPPLY STERILE: Performed by: OBSTETRICS & GYNECOLOGY

## 2022-10-28 PROCEDURE — 250N000011 HC RX IP 250 OP 636: Performed by: OBSTETRICS & GYNECOLOGY

## 2022-10-28 PROCEDURE — 86901 BLOOD TYPING SEROLOGIC RH(D): CPT | Performed by: OBSTETRICS & GYNECOLOGY

## 2022-10-28 PROCEDURE — 85018 HEMOGLOBIN: CPT | Performed by: ANESTHESIOLOGY

## 2022-10-28 PROCEDURE — 88307 TISSUE EXAM BY PATHOLOGIST: CPT | Mod: 26 | Performed by: PATHOLOGY

## 2022-10-28 PROCEDURE — C1769 GUIDE WIRE: HCPCS | Performed by: OBSTETRICS & GYNECOLOGY

## 2022-10-28 PROCEDURE — 710N000010 HC RECOVERY PHASE 1, LEVEL 2, PER MIN: Performed by: OBSTETRICS & GYNECOLOGY

## 2022-10-28 PROCEDURE — 250N000009 HC RX 250: Performed by: ANESTHESIOLOGY

## 2022-10-28 PROCEDURE — 710N000012 HC RECOVERY PHASE 2, PER MINUTE: Performed by: OBSTETRICS & GYNECOLOGY

## 2022-10-28 PROCEDURE — 86850 RBC ANTIBODY SCREEN: CPT | Performed by: OBSTETRICS & GYNECOLOGY

## 2022-10-28 PROCEDURE — 250N000013 HC RX MED GY IP 250 OP 250 PS 637: Performed by: UROLOGY

## 2022-10-28 PROCEDURE — 360N000080 HC SURGERY LEVEL 7, PER MIN: Performed by: OBSTETRICS & GYNECOLOGY

## 2022-10-28 PROCEDURE — 88305 TISSUE EXAM BY PATHOLOGIST: CPT | Mod: 26 | Performed by: PATHOLOGY

## 2022-10-28 PROCEDURE — C1758 CATHETER, URETERAL: HCPCS | Performed by: OBSTETRICS & GYNECOLOGY

## 2022-10-28 PROCEDURE — 370N000017 HC ANESTHESIA TECHNICAL FEE, PER MIN: Performed by: OBSTETRICS & GYNECOLOGY

## 2022-10-28 PROCEDURE — 99207 PR BUNDLED PROCEDURE IN GLOBAL PKG STATISTIC: CPT | Performed by: OBSTETRICS & GYNECOLOGY

## 2022-10-28 PROCEDURE — 258N000003 HC RX IP 258 OP 636: Performed by: STUDENT IN AN ORGANIZED HEALTH CARE EDUCATION/TRAINING PROGRAM

## 2022-10-28 PROCEDURE — 36415 COLL VENOUS BLD VENIPUNCTURE: CPT | Performed by: OBSTETRICS & GYNECOLOGY

## 2022-10-28 PROCEDURE — 250N000025 HC SEVOFLURANE, PER MIN: Performed by: OBSTETRICS & GYNECOLOGY

## 2022-10-28 PROCEDURE — C1781 MESH (IMPLANTABLE): HCPCS | Performed by: OBSTETRICS & GYNECOLOGY

## 2022-10-28 PROCEDURE — 250N000011 HC RX IP 250 OP 636: Performed by: ANESTHESIOLOGY

## 2022-10-28 PROCEDURE — 88305 TISSUE EXAM BY PATHOLOGIST: CPT | Mod: TC | Performed by: OBSTETRICS & GYNECOLOGY

## 2022-10-28 PROCEDURE — 57426 REVISE PROSTH VAG GRAFT LAP: CPT | Mod: 52 | Performed by: UROLOGY

## 2022-10-28 PROCEDURE — 250N000009 HC RX 250: Performed by: STUDENT IN AN ORGANIZED HEALTH CARE EDUCATION/TRAINING PROGRAM

## 2022-10-28 PROCEDURE — 999N000141 HC STATISTIC PRE-PROCEDURE NURSING ASSESSMENT: Performed by: OBSTETRICS & GYNECOLOGY

## 2022-10-28 DEVICE — POLYPROPYLENE MESH FOR SACROCOLPOSUSPENSION/SACROCOLPOPEXY - M
Type: IMPLANTABLE DEVICE | Site: PELVIS | Status: FUNCTIONAL
Brand: RESTORELLE

## 2022-10-28 RX ORDER — LABETALOL HYDROCHLORIDE 5 MG/ML
10 INJECTION, SOLUTION INTRAVENOUS
Status: DISCONTINUED | OUTPATIENT
Start: 2022-10-28 | End: 2022-10-28 | Stop reason: HOSPADM

## 2022-10-28 RX ORDER — FENTANYL CITRATE 50 UG/ML
25-50 INJECTION, SOLUTION INTRAMUSCULAR; INTRAVENOUS EVERY 5 MIN PRN
Status: DISCONTINUED | OUTPATIENT
Start: 2022-10-28 | End: 2022-10-28 | Stop reason: HOSPADM

## 2022-10-28 RX ORDER — FENTANYL CITRATE 50 UG/ML
25-50 INJECTION, SOLUTION INTRAMUSCULAR; INTRAVENOUS
Status: DISCONTINUED | OUTPATIENT
Start: 2022-10-28 | End: 2022-10-28 | Stop reason: HOSPADM

## 2022-10-28 RX ORDER — DEXAMETHASONE SODIUM PHOSPHATE 10 MG/ML
INJECTION, SOLUTION INTRAMUSCULAR; INTRAVENOUS
Status: COMPLETED | OUTPATIENT
Start: 2022-10-28 | End: 2022-10-28

## 2022-10-28 RX ORDER — ONDANSETRON 2 MG/ML
4 INJECTION INTRAMUSCULAR; INTRAVENOUS EVERY 30 MIN PRN
Status: DISCONTINUED | OUTPATIENT
Start: 2022-10-28 | End: 2022-10-28 | Stop reason: HOSPADM

## 2022-10-28 RX ORDER — KETAMINE HYDROCHLORIDE 10 MG/ML
INJECTION INTRAMUSCULAR; INTRAVENOUS PRN
Status: DISCONTINUED | OUTPATIENT
Start: 2022-10-28 | End: 2022-10-28

## 2022-10-28 RX ORDER — NALOXONE HYDROCHLORIDE 0.4 MG/ML
0.4 INJECTION, SOLUTION INTRAMUSCULAR; INTRAVENOUS; SUBCUTANEOUS
Status: DISCONTINUED | OUTPATIENT
Start: 2022-10-28 | End: 2022-10-28 | Stop reason: HOSPADM

## 2022-10-28 RX ORDER — LIDOCAINE 40 MG/G
CREAM TOPICAL
Status: DISCONTINUED | OUTPATIENT
Start: 2022-10-28 | End: 2022-10-28 | Stop reason: HOSPADM

## 2022-10-28 RX ORDER — GLYCOPYRROLATE 0.2 MG/ML
INJECTION, SOLUTION INTRAMUSCULAR; INTRAVENOUS PRN
Status: DISCONTINUED | OUTPATIENT
Start: 2022-10-28 | End: 2022-10-28

## 2022-10-28 RX ORDER — DIPHENHYDRAMINE HYDROCHLORIDE 50 MG/ML
12.5 INJECTION INTRAMUSCULAR; INTRAVENOUS EVERY 6 HOURS PRN
Status: DISCONTINUED | OUTPATIENT
Start: 2022-10-28 | End: 2022-10-28 | Stop reason: HOSPADM

## 2022-10-28 RX ORDER — KETOROLAC TROMETHAMINE 30 MG/ML
INJECTION, SOLUTION INTRAMUSCULAR; INTRAVENOUS PRN
Status: DISCONTINUED | OUTPATIENT
Start: 2022-10-28 | End: 2022-10-28

## 2022-10-28 RX ORDER — DEXAMETHASONE SODIUM PHOSPHATE 4 MG/ML
INJECTION, SOLUTION INTRA-ARTICULAR; INTRALESIONAL; INTRAMUSCULAR; INTRAVENOUS; SOFT TISSUE PRN
Status: DISCONTINUED | OUTPATIENT
Start: 2022-10-28 | End: 2022-10-28

## 2022-10-28 RX ORDER — NALOXONE HYDROCHLORIDE 0.4 MG/ML
0.2 INJECTION, SOLUTION INTRAMUSCULAR; INTRAVENOUS; SUBCUTANEOUS
Status: DISCONTINUED | OUTPATIENT
Start: 2022-10-28 | End: 2022-10-28 | Stop reason: HOSPADM

## 2022-10-28 RX ORDER — FENTANYL CITRATE 50 UG/ML
25 INJECTION, SOLUTION INTRAMUSCULAR; INTRAVENOUS
Status: DISCONTINUED | OUTPATIENT
Start: 2022-10-28 | End: 2022-10-28 | Stop reason: HOSPADM

## 2022-10-28 RX ORDER — IBUPROFEN 600 MG/1
600 TABLET, FILM COATED ORAL ONCE
Status: CANCELLED | OUTPATIENT
Start: 2022-10-28 | End: 2022-10-28

## 2022-10-28 RX ORDER — LIDOCAINE HYDROCHLORIDE 20 MG/ML
INJECTION, SOLUTION INFILTRATION; PERINEURAL PRN
Status: DISCONTINUED | OUTPATIENT
Start: 2022-10-28 | End: 2022-10-28

## 2022-10-28 RX ORDER — ACETAMINOPHEN 325 MG/1
975 TABLET ORAL ONCE
Status: DISCONTINUED | OUTPATIENT
Start: 2022-10-28 | End: 2022-10-28 | Stop reason: HOSPADM

## 2022-10-28 RX ORDER — HEPARIN SODIUM 5000 [USP'U]/.5ML
5000 INJECTION, SOLUTION INTRAVENOUS; SUBCUTANEOUS
Status: COMPLETED | OUTPATIENT
Start: 2022-10-28 | End: 2022-10-28

## 2022-10-28 RX ORDER — KETOROLAC TROMETHAMINE 30 MG/ML
15 INJECTION, SOLUTION INTRAMUSCULAR; INTRAVENOUS EVERY 6 HOURS PRN
Status: DISCONTINUED | OUTPATIENT
Start: 2022-10-28 | End: 2022-10-28 | Stop reason: HOSPADM

## 2022-10-28 RX ORDER — OXYCODONE HYDROCHLORIDE 5 MG/1
5 TABLET ORAL EVERY 6 HOURS PRN
Qty: 6 TABLET | Refills: 0 | Status: SHIPPED | OUTPATIENT
Start: 2022-10-28 | End: 2022-10-31

## 2022-10-28 RX ORDER — DIPHENHYDRAMINE HCL 12.5MG/5ML
12.5 LIQUID (ML) ORAL EVERY 6 HOURS PRN
Status: DISCONTINUED | OUTPATIENT
Start: 2022-10-28 | End: 2022-10-28 | Stop reason: HOSPADM

## 2022-10-28 RX ORDER — PROPOFOL 10 MG/ML
INJECTION, EMULSION INTRAVENOUS PRN
Status: DISCONTINUED | OUTPATIENT
Start: 2022-10-28 | End: 2022-10-28

## 2022-10-28 RX ORDER — CEFAZOLIN SODIUM/WATER 2 G/20 ML
2 SYRINGE (ML) INTRAVENOUS
Status: COMPLETED | OUTPATIENT
Start: 2022-10-28 | End: 2022-10-28

## 2022-10-28 RX ORDER — AMOXICILLIN 250 MG
1-2 CAPSULE ORAL 2 TIMES DAILY
Qty: 30 TABLET | Refills: 0 | Status: SHIPPED | OUTPATIENT
Start: 2022-10-28

## 2022-10-28 RX ORDER — METRONIDAZOLE 500 MG/100ML
500 INJECTION, SOLUTION INTRAVENOUS
Status: COMPLETED | OUTPATIENT
Start: 2022-10-28 | End: 2022-10-28

## 2022-10-28 RX ORDER — SODIUM CHLORIDE, SODIUM LACTATE, POTASSIUM CHLORIDE, CALCIUM CHLORIDE 600; 310; 30; 20 MG/100ML; MG/100ML; MG/100ML; MG/100ML
INJECTION, SOLUTION INTRAVENOUS CONTINUOUS
Status: DISCONTINUED | OUTPATIENT
Start: 2022-10-28 | End: 2022-10-28 | Stop reason: HOSPADM

## 2022-10-28 RX ORDER — OXYCODONE HYDROCHLORIDE 5 MG/1
5-10 TABLET ORAL EVERY 4 HOURS PRN
Status: DISCONTINUED | OUTPATIENT
Start: 2022-10-28 | End: 2022-10-28 | Stop reason: HOSPADM

## 2022-10-28 RX ORDER — ONDANSETRON 4 MG/1
4 TABLET, ORALLY DISINTEGRATING ORAL EVERY 30 MIN PRN
Status: DISCONTINUED | OUTPATIENT
Start: 2022-10-28 | End: 2022-10-28 | Stop reason: HOSPADM

## 2022-10-28 RX ORDER — FENTANYL CITRATE 50 UG/ML
25 INJECTION, SOLUTION INTRAMUSCULAR; INTRAVENOUS EVERY 5 MIN PRN
Status: DISCONTINUED | OUTPATIENT
Start: 2022-10-28 | End: 2022-10-28 | Stop reason: HOSPADM

## 2022-10-28 RX ORDER — BUPIVACAINE HYDROCHLORIDE 2.5 MG/ML
INJECTION, SOLUTION EPIDURAL; INFILTRATION; INTRACAUDAL
Status: COMPLETED | OUTPATIENT
Start: 2022-10-28 | End: 2022-10-28

## 2022-10-28 RX ORDER — CEFAZOLIN SODIUM/WATER 2 G/20 ML
2 SYRINGE (ML) INTRAVENOUS SEE ADMIN INSTRUCTIONS
Status: DISCONTINUED | OUTPATIENT
Start: 2022-10-28 | End: 2022-10-28 | Stop reason: HOSPADM

## 2022-10-28 RX ORDER — FENTANYL CITRATE 50 UG/ML
INJECTION, SOLUTION INTRAMUSCULAR; INTRAVENOUS PRN
Status: DISCONTINUED | OUTPATIENT
Start: 2022-10-28 | End: 2022-10-28

## 2022-10-28 RX ORDER — OXYCODONE HYDROCHLORIDE 5 MG/1
5 TABLET ORAL
Status: CANCELLED | OUTPATIENT
Start: 2022-10-28

## 2022-10-28 RX ORDER — HYDROMORPHONE HCL IN WATER/PF 6 MG/30 ML
0.2 PATIENT CONTROLLED ANALGESIA SYRINGE INTRAVENOUS EVERY 5 MIN PRN
Status: DISCONTINUED | OUTPATIENT
Start: 2022-10-28 | End: 2022-10-28 | Stop reason: HOSPADM

## 2022-10-28 RX ORDER — HALOPERIDOL 5 MG/ML
1 INJECTION INTRAMUSCULAR
Status: DISCONTINUED | OUTPATIENT
Start: 2022-10-28 | End: 2022-10-28 | Stop reason: HOSPADM

## 2022-10-28 RX ORDER — OXYCODONE HYDROCHLORIDE 5 MG/1
5 TABLET ORAL EVERY 4 HOURS PRN
Status: DISCONTINUED | OUTPATIENT
Start: 2022-10-28 | End: 2022-10-28 | Stop reason: HOSPADM

## 2022-10-28 RX ORDER — EPHEDRINE SULFATE 50 MG/ML
INJECTION, SOLUTION INTRAMUSCULAR; INTRAVENOUS; SUBCUTANEOUS PRN
Status: DISCONTINUED | OUTPATIENT
Start: 2022-10-28 | End: 2022-10-28

## 2022-10-28 RX ORDER — DEXMEDETOMIDINE HYDROCHLORIDE 4 UG/ML
INJECTION, SOLUTION INTRAVENOUS
Status: COMPLETED | OUTPATIENT
Start: 2022-10-28 | End: 2022-10-28

## 2022-10-28 RX ORDER — FLUMAZENIL 0.1 MG/ML
0.2 INJECTION, SOLUTION INTRAVENOUS
Status: DISCONTINUED | OUTPATIENT
Start: 2022-10-28 | End: 2022-10-28 | Stop reason: HOSPADM

## 2022-10-28 RX ORDER — HYDRALAZINE HYDROCHLORIDE 20 MG/ML
2.5-5 INJECTION INTRAMUSCULAR; INTRAVENOUS EVERY 10 MIN PRN
Status: DISCONTINUED | OUTPATIENT
Start: 2022-10-28 | End: 2022-10-28 | Stop reason: HOSPADM

## 2022-10-28 RX ORDER — HYDROMORPHONE HYDROCHLORIDE 1 MG/ML
.2-.5 INJECTION, SOLUTION INTRAMUSCULAR; INTRAVENOUS; SUBCUTANEOUS EVERY 5 MIN PRN
Status: DISCONTINUED | OUTPATIENT
Start: 2022-10-28 | End: 2022-10-28 | Stop reason: HOSPADM

## 2022-10-28 RX ORDER — PHENAZOPYRIDINE HYDROCHLORIDE 200 MG/1
200 TABLET, FILM COATED ORAL
Status: COMPLETED | OUTPATIENT
Start: 2022-10-28 | End: 2022-10-28

## 2022-10-28 RX ORDER — ACETAMINOPHEN 325 MG/1
975 TABLET ORAL ONCE
Status: CANCELLED | OUTPATIENT
Start: 2022-10-28 | End: 2022-10-28

## 2022-10-28 RX ADMIN — SUGAMMADEX 200 MG: 100 INJECTION, SOLUTION INTRAVENOUS at 11:08

## 2022-10-28 RX ADMIN — PHENYLEPHRINE HYDROCHLORIDE 150 MCG: 10 INJECTION INTRAVENOUS at 07:50

## 2022-10-28 RX ADMIN — Medication 10 MG: at 07:40

## 2022-10-28 RX ADMIN — DEXAMETHASONE SODIUM PHOSPHATE 2 MG: 10 INJECTION, SOLUTION INTRAMUSCULAR; INTRAVENOUS at 07:16

## 2022-10-28 RX ADMIN — PROPOFOL 30 MG: 10 INJECTION, EMULSION INTRAVENOUS at 08:22

## 2022-10-28 RX ADMIN — PHENYLEPHRINE HYDROCHLORIDE 150 MCG: 10 INJECTION INTRAVENOUS at 07:46

## 2022-10-28 RX ADMIN — FENTANYL CITRATE 25 MCG: 50 INJECTION, SOLUTION INTRAMUSCULAR; INTRAVENOUS at 08:14

## 2022-10-28 RX ADMIN — Medication 10 MG: at 10:45

## 2022-10-28 RX ADMIN — PHENYLEPHRINE HYDROCHLORIDE 100 MCG: 10 INJECTION INTRAVENOUS at 09:05

## 2022-10-28 RX ADMIN — HYDROMORPHONE HYDROCHLORIDE 0.5 MG: 1 INJECTION, SOLUTION INTRAMUSCULAR; INTRAVENOUS; SUBCUTANEOUS at 08:27

## 2022-10-28 RX ADMIN — Medication 10 MG: at 08:12

## 2022-10-28 RX ADMIN — BUPIVACAINE HYDROCHLORIDE 60 ML: 2.5 INJECTION, SOLUTION EPIDURAL; INFILTRATION; INTRACAUDAL; PERINEURAL at 07:16

## 2022-10-28 RX ADMIN — PROPOFOL 20 MG: 10 INJECTION, EMULSION INTRAVENOUS at 11:22

## 2022-10-28 RX ADMIN — METRONIDAZOLE 500 MG: 500 INJECTION, SOLUTION INTRAVENOUS at 07:08

## 2022-10-28 RX ADMIN — SODIUM CHLORIDE, POTASSIUM CHLORIDE, SODIUM LACTATE AND CALCIUM CHLORIDE: 600; 310; 30; 20 INJECTION, SOLUTION INTRAVENOUS at 07:31

## 2022-10-28 RX ADMIN — PHENAZOPYRIDINE HYDROCHLORIDE 200 MG: 200 TABLET ORAL at 07:06

## 2022-10-28 RX ADMIN — FENTANYL CITRATE 50 MCG: 50 INJECTION, SOLUTION INTRAMUSCULAR; INTRAVENOUS at 07:17

## 2022-10-28 RX ADMIN — PROPOFOL 100 MG: 10 INJECTION, EMULSION INTRAVENOUS at 07:43

## 2022-10-28 RX ADMIN — FENTANYL CITRATE 75 MCG: 50 INJECTION, SOLUTION INTRAMUSCULAR; INTRAVENOUS at 07:40

## 2022-10-28 RX ADMIN — Medication 10 MG: at 08:38

## 2022-10-28 RX ADMIN — Medication 40 MG: at 07:44

## 2022-10-28 RX ADMIN — KETOROLAC TROMETHAMINE 30 MG: 30 INJECTION, SOLUTION INTRAMUSCULAR at 11:13

## 2022-10-28 RX ADMIN — PHENYLEPHRINE HYDROCHLORIDE 200 MCG: 10 INJECTION INTRAVENOUS at 07:54

## 2022-10-28 RX ADMIN — Medication 10 MG: at 09:59

## 2022-10-28 RX ADMIN — Medication 2 G: at 08:09

## 2022-10-28 RX ADMIN — SODIUM CHLORIDE, POTASSIUM CHLORIDE, SODIUM LACTATE AND CALCIUM CHLORIDE: 600; 310; 30; 20 INJECTION, SOLUTION INTRAVENOUS at 06:50

## 2022-10-28 RX ADMIN — HEPARIN SODIUM 5000 UNITS: 5000 INJECTION, SOLUTION INTRAVENOUS; SUBCUTANEOUS at 06:50

## 2022-10-28 RX ADMIN — Medication 20 MG: at 08:51

## 2022-10-28 RX ADMIN — ONDANSETRON 4 MG: 2 INJECTION INTRAMUSCULAR; INTRAVENOUS at 11:12

## 2022-10-28 RX ADMIN — DEXAMETHASONE SODIUM PHOSPHATE 4 MG: 4 INJECTION, SOLUTION INTRA-ARTICULAR; INTRALESIONAL; INTRAMUSCULAR; INTRAVENOUS; SOFT TISSUE at 08:23

## 2022-10-28 RX ADMIN — Medication 40 MCG: at 07:16

## 2022-10-28 RX ADMIN — MIDAZOLAM HYDROCHLORIDE 1 MG: 1 INJECTION, SOLUTION INTRAMUSCULAR; INTRAVENOUS at 07:16

## 2022-10-28 RX ADMIN — LIDOCAINE HYDROCHLORIDE 80 MG: 20 INJECTION, SOLUTION INFILTRATION; PERINEURAL at 07:41

## 2022-10-28 RX ADMIN — FENTANYL CITRATE 50 MCG: 50 INJECTION, SOLUTION INTRAMUSCULAR; INTRAVENOUS at 08:27

## 2022-10-28 RX ADMIN — GLYCOPYRROLATE 0.2 MG: 0.2 INJECTION, SOLUTION INTRAMUSCULAR; INTRAVENOUS at 08:01

## 2022-10-28 RX ADMIN — GLYCOPYRROLATE 0.2 MG: 0.2 INJECTION, SOLUTION INTRAMUSCULAR; INTRAVENOUS at 07:58

## 2022-10-28 ASSESSMENT — ACTIVITIES OF DAILY LIVING (ADL)
ADLS_ACUITY_SCORE: 20

## 2022-10-28 ASSESSMENT — COPD QUESTIONNAIRES: COPD: 0

## 2022-10-28 ASSESSMENT — LIFESTYLE VARIABLES: TOBACCO_USE: 0

## 2022-10-28 ASSESSMENT — ENCOUNTER SYMPTOMS: SEIZURES: 0

## 2022-10-28 NOTE — ANESTHESIA PROCEDURE NOTES
TAP Procedure Note    Pre-Procedure   Staff -        Anesthesiologist:  Kleber Collier MD       Resident/Fellow: Keyshawn Castillo MD       Performed By: resident       Location: pre-op       Pre-Anesthestic Checklist: patient identified, IV checked, site marked, risks and benefits discussed, informed consent, monitors and equipment checked, pre-op evaluation, at physician/surgeon's request and post-op pain management  Timeout:       Correct Patient: Yes        Correct Procedure: Yes        Correct Site: Yes        Correct Position: Yes        Correct Laterality: Yes        Site Marked: Yes  Procedure Documentation  Procedure: TAP       Diagnosis: POST OPERATIVE PAIN       Laterality: bilateral       Patient Position: supine       Patient Prep/Sterile Barriers: sterile gloves, mask       Skin prep: Chloraprep       Needle Type: short bevel       Needle Gauge: 21.        Needle Length (millimeters): 110        Ultrasound guided       1. Ultrasound was used to identify targeted nerve, plexus, vascular marker, or fascial plane and place a needle adjacent to it in real-time.       2. Ultrasound was used to visualize the spread of anesthetic in close proximity to the above referenced structure.       3. A permanent image is entered into the patient's record.    Assessment/Narrative         The placement was negative for: blood aspirated, painful injection and site bleeding       Paresthesias: No.       Bolus given via needle..        Secured via.        Insertion/Infusion Method: Single Shot       Complications: none       Injection made incrementally with aspirations every 5 mL.    Medication(s) Administered   Bupivacaine 0.25% PF (Infiltration) - Infiltration   60 mL - 10/28/2022 7:16:00 AM  Dexmedetomidine 4 mcg/mL (Perineural) - Perineural   40 mcg - 10/28/2022 7:16:00 AM  Dexamethasone 10 mg/mL PF (Perineural) - Perineural   2 mg - 10/28/2022 7:16:00 AM    FOR [facility] [place] [place] ONLY:   Pain Team  "Contact information: please page the Pain Team Via [facility]. Search \"Pain\". During daytime hours, please page the attending first. At night please page the resident first.    "

## 2022-10-28 NOTE — OP NOTE
October 28, 2022    Preoperative diagnosis:   1. Endometrial cancer  2. History of supracervical hysterectomy with sacrocolopexy    Postoperative diagnosis: Same    Procedure:   1) Cystoscopy with temporary bilateral ureteral stents  2) Robotic assisted laparoscopic sacrocolpopexy revision (removal of some of the old mesh and placement of new mesh)  3) Robotic assisted laparoscopic trachelectomy and bilateral oophorectomy by Dr Richards    Surgeon: Tiffani Hanks MD     CoSurgeon: Hayden Richards MD    Assistant: Barrington Go MD (gyn onc fellow)    Anesthesia: General with block    EBL: 18 mL for our portion  UOP: please see anesthesia record  IVF: please see anesthesia record    Drains: 16 Fr waddell catheter    Complications: None noted    Specimens: None from the urogynecology part    Findings: Cystoscopy at beginning of case unremarkable, brisk bilateral efflux.  At the end of the case appropriate apical support.  Cystoscopy after the sacrocolpopexy revision with intact urothelium, no foreign bodies, brisk bilateral efflux    Indication for procedure: Carole Grover is 67 year old with recent supracervical hysterectomy and sacrocolpopexy for uterovaginal prolapse.  Her pathology returned as endometrial cancer and thus was recommended for a trachelectomy and bilateral oophorectomy.  I was asked to help in assistance with her sacrocolpopexy to revise versus convert to another procedure.    Summary of procedure:  After patient was identified in the pre-operative area and procedure verified, informed consent was obtained.  After this patient was taken to the operating suite and placed in the supine position.  Intravenous cedrick-operative antibiotics were administered by anesthesia.  After appropriate anesthesia was induced and the airway secured, patient was placed in the dorsal lithotomy position in supportive yellow-fin stirrups with careful attention to neural safety in positioning of all four extremities.  Orogastric  tube was placed by anesthesia to suction.  At this time patient was prepped and draped in the standard fashion.      A time out was performed to confirm patient and procedure.    Dr Richards placed the robotic ports (please see her note for full details)    A 22 Fr rigid cystoscope was inserted into a normal appearing urethral meatus.  The urothelium was carefully examined and there were the above lesions noted.  There were no obvious tumors, stones, foreign body or other urothelial abnormalities noted. Bilateral ureteral orifices were noted in the normal orthotopic position and both effluxed clear urine.  Left ureteral orifice identified and wire placed, then the 5 Fr open ended was easily placed up to 24 cm and then the wire removed and this was repeated on the right side.  Then the cystoscope was removed and the open ended catheters were placed into the waddell catheter with a angiocath.  The waddell catheter drained pyridium colored urine for the duration of the case.    Then Dr Richards proceeded with the trachelectomy and bilateral oophorectomy.  I did tag the left ureterosacral ligament at the beginning of the case.  The right one was not easily identified as it was incorporated into the mesh.  During the trachelectomy I was present to assist in identification of the sacrocolpopexy mesh and its transection.    After the trachelectomy was completed and the cervix and ovaries removed, then the vaginal cuff was closed by Dr Richards.  At this time I began the sacrocolpopexy.  I dissected the bladder off the vagina about 2/3 of the was anteriorly and then posteriorly at the level of the rectal reflection I made a small opening in the peritoneum on the posterior vagina.  At this time the polypropylene mesh was attached to the vagina with gortex suture with care to avoid the vaginal cuff in placement of the sutures.  The mesh was then attached to the remnant mesh that was present with 2 gortex sutures.  Vaginal exam at this  time revealed good apical support and no sutures or other foreign body in the vagina.      At this time cystoscopy for lower urinary tract safety was performed.  The waddell and ureteral stents were removed.  The cystoscope was inserted into a normal appearing urethra.  The urothelium was normal, intact, with no obvious tumors, stones, foreign body or other urothelial abnormalities noted.  Brisk bilateral efflux.  Waddell catheter was replaced.      We looked in the abdomen at a pressure of 5mm Hg and appropriate hemostasis was obtained We also visually noted that there was no potential space in the pelvis for potential internal hernia. The case was turned over to Dr Richards's team to remove the ports and close the port sites.    Counts were reported as correct    Patient went to the recovery room in good condition    I spoke to her  at the end of the case    Plan: Cares per Dr Richards's team, TOV in phase 2, follow up with Dr Hanks's team in 4 weeks    Tiffani Hanks MD MPH  (she/her/hers)   of Urology  [facility]

## 2022-10-28 NOTE — OR NURSING
Bilateral TAP block performed by Billy/ Jonathan, Amira, and Zach. Pt tolerated well. Patient denies any adverse signs or symptoms. See flow sheet/MAR for monitoring.

## 2022-10-28 NOTE — ANESTHESIA CARE TRANSFER NOTE
Patient: Carole Grover    Procedure: Procedure(s):  Robotic-assisted laparoscopic trachelectomy (removal of the cervix), excision of mesh  Bilateral oophorectomy  ROBOTIC ASSISTED LAPAROSCOPIC SACRCOCOLPOPEXY MESH REVISION  Combined Cystoscopy,  with bilateral temporary ureteral stents       Diagnosis: Endometrial cancer (H) [C54.1]  Diagnosis Additional Information: No value filed.    Anesthesia Type:   General     Note:    Oropharynx: oropharynx clear of all foreign objects and spontaneously breathing  Level of Consciousness: drowsy  Oxygen Supplementation: face mask  Level of Supplemental Oxygen (L/min / FiO2): 6  Independent Airway: airway patency satisfactory and stable  Dentition: dentition unchanged  Vital Signs Stable: post-procedure vital signs reviewed and stable  Report to RN Given: handoff report given  Patient transferred to: PACU    Handoff Report: Identifed the Patient, Identified the Reponsible Provider, Reviewed the pertinent medical history, Discussed the surgical course, Reviewed Intra-OP anesthesia mangement and issues during anesthesia, Set expectations for post-procedure period and Allowed opportunity for questions and acknowledgement of understanding      Vitals:  Vitals Value Taken Time   /80 10/28/22 1134   Temp     Pulse 59 10/28/22 1139   Resp 9 10/28/22 1139   SpO2 100 % 10/28/22 1139   Vitals shown include unvalidated device data.    Electronically Signed By: Mark Serrano MD  October 28, 2022  11:40 AM

## 2022-10-28 NOTE — BRIEF OP NOTE
[facility]    Brief Operative Note    Pre-operative diagnosis:   Endometrial cancer (H) [C54.1]   Prior sacrocolpopexy with mesh placement.  Prior supracervical hysterectomy    Post-operative diagnosis Same as pre-operative diagnosis    Procedure(s):  Robotic-assisted laparoscopic trachelectomy (removal of the cervix)  Bilateral oophorectomy  ROBOTIC ASSISTED LAPAROSCOPIC SACRCOCOLPOPEXY MESH REVISION VERSUS  UTEROSACRAL LIGAMENT SUSPENSION  Combined Cystoscopy,  with bilateral temporary ureteral stents    Surgeon(s) and Role:  Panel 1:     * Hayden Patel MD - Primary     * Barrington Go MD - Fellow - Assisting  Panel 2:     * Tiffani Hanks MD - Primary     Anesthesia: General with Block     Estimated Blood Loss: 25ml     Drains: None    Specimens:   ID Type Source Tests Collected by Time Destination   1 : Right and Left ovary  Tissue Other SURGICAL PATHOLOGY EXAM Hayden Patel MD 10/28/2022  8:55 AM    2 : Cervix and mesh  Tissue Cervix SURGICAL PATHOLOGY EXAM Hayden Patel MD 10/28/2022  9:39 AM      Findings: See full dictation.     Complications: None   .  Implants:   Implant Name Type Inv. Item Serial No.  Lot No. LRB No. Used Action   MESH SLING FLAT RESTORELLE [id number] - [id number] Mesh MESH SLING FLAT RESTORELLE [id number]  COLOPLAST [id number] N/A 1 Implanted

## 2022-10-28 NOTE — PROGRESS NOTES
October 28, 2022    Patient seen in the preoperative area.  She denies any changes in her health since last visit.  Allergies confirmed.  Procedure and its risks again discussed.  Discussed that we would try to revise the sacrocolpopexy but if unable to safely then we would consider uterosacral ligament suspension.  At this time questions answered and paper consents signed for cystoscopy with bilateral temporary stents, robotic assisted sacrocolpopexy revision versus uterosacral ligament suspension.  Plan to proceed as scheduled with Dr Maurice Hanks MD MPH  (she/her/hers)   of Urology  [facility]

## 2022-10-28 NOTE — PROGRESS NOTES
Gynecologic Oncology Postoperative Check Note  10/28/2022    S: Patient reports she is doing well postoperatively. Pain is well controlled with po pain medication. Ambulating without pain. Voiding spontaneously. Tolerating water without nausea or vomiting, hasn't yet tried crackers. Denies chest pain, shortness of breath, dizziness, or other concerns at this time.    O:  Vitals:    10/28/22 1415 10/28/22 1430 10/28/22 1457 10/28/22 1540   BP: 114/64 137/76 120/60 117/54   BP Location:   Left arm    Cuff Size:    Adult Regular   Pulse: 52 63 53 59   Resp: 12 27 16 16   Temp: 98.4  F (36.9  C)  (!) 96.7  F (35.9  C) 97  F (36.1  C)   TempSrc: Core  Oral    SpO2: 94% 99% 98% 100%   Weight:       Height:           Gen: In no acute distress  Cardio: RRR, S1/S2, no murmurs  Resp: CTAB, no wheezing or crackles  Abdomen: soft, appropriately tender, incisions c/d/i covered in exophin  Extremities: Non-tender, trace edema    A: 67 year old POD#0 s/p RA laparoscopic trachelectomy, excision of mesh, bilateral oophorectomy, sacrocolpopexy with mesh revision, and cystoscopy. Doing well in the immediate postoperative period.    Dz: endometrial cancer  FEN: Advance as tolerated  Pain: tylenol, ibuprofen, oxycodone PRN  GI: sent with bowel regimen  : voiding spontaneously    Dispo: To home    Christina Rizo MD  Obstetrics and Gyncology, PGY-2  October 28, 2022 , 4:17 PM

## 2022-10-28 NOTE — ANESTHESIA POSTPROCEDURE EVALUATION
Patient: Carole Grover    Procedure: Procedure(s):  Robotic-assisted laparoscopic trachelectomy (removal of the cervix), excision of mesh  Bilateral oophorectomy  ROBOTIC ASSISTED LAPAROSCOPIC SACRCOCOLPOPEXY WITH MESH REVISION  Combined Cystoscopy,  with bilateral temporary ureteral stents       Anesthesia Type:  General    Note:  Disposition: Inpatient   Postop Pain Control: Uneventful            Sign Out: Well controlled pain   PONV: No   Neuro/Psych: Uneventful            Sign Out: Acceptable/Baseline neuro status   Airway/Respiratory: Uneventful            Sign Out: Acceptable/Baseline resp. status   CV/Hemodynamics: Uneventful            Sign Out: Acceptable CV status; No obvious hypovolemia; No obvious fluid overload   Other NRE: NONE   DID A NON-ROUTINE EVENT OCCUR? No           Last vitals:  Vitals Value Taken Time   /80 10/28/22 1134   Temp     Pulse 73 10/28/22 1140   Resp 7 10/28/22 1140   SpO2 100 % 10/28/22 1140   Vitals shown include unvalidated device data.    Electronically Signed By: Manda Soria MD  October 28, 2022  11:41 AM

## 2022-10-28 NOTE — OP NOTE
Paged gynecology @ 153    Elzbieta Valle [facility] 4 urinated. *[phone]

## 2022-10-28 NOTE — OR NURSING
Bladder filled with 300 ml. Bright catheter removed at 1315. No issues noted.     Dr. Hernandez stated patient can transfer to Phase II to void.

## 2022-10-28 NOTE — DISCHARGE INSTRUCTIONS
Follow-up appointments  You have scheduled post-operative visits in person on 11/21/2022 with Dr. Hanks's PA and with Dr. Patel in [place].   If that works for you, we will plan to keep that as an in person visit and we can discuss your surgery results. When you finish with your Urology appointment that day, you can check in for your cancer appointment and I will try to squeeze you in earlier than your afternoon appointment. The scheduling and nurse numbers are below for that clinic in case you need to make any changes.     If there was tissue removed at surgery, the pathology results from surgery generally take 1-2 weeks to return. These will be discussed at your post-operative appointment unless another plan has been made with your physician. Be aware that due to recent national legislation requiring immediate access to medical results, you may receive your results via [place] before your physician has had the chance to review them. We recognize that this can create stress or anxiety and lead to many questions. Your provider will discuss your results in detail with you at your post-operative appointment (unless another plan was made for results discussion before surgery). Our clinic nurses and staff generally do not have any additional information about the results that they are able to share prior to that time.     [place] [facility]  Nurse triage line: [phone]  Clinic scheduling: [phone]  [facility]  Same-Day Surgery   Adult Discharge Orders & Instructions     For 24 hours after surgery    Get plenty of rest.  A responsible adult must stay with you for at least 24 hours after you leave the hospital.   Do not drive or use heavy equipment.  If you have weakness or tingling, don't drive or use heavy equipment until this feeling goes away.  Do not drink alcohol.  Avoid strenuous or risky activities.  Ask for help when climbing stairs.   You  may feel lightheaded.  IF so, sit for a few minutes before standing.  Have someone help you get up.   If you have nausea (feel sick to your stomach): Drink only clear liquids such as apple juice, ginger ale, broth or 7-Up.  Rest may also help.  Be sure to drink enough fluids.  Move to a regular diet as you feel able.  You may have a slight fever. Call the doctor if your fever is over 100 F (37.7 C) (taken under the tongue) or lasts longer than 24 hours.  You may have a dry mouth, a sore throat, muscle aches or trouble sleeping.  These should go away after 24 hours.  Do not make important or legal decisions.   Call your doctor for any of the followin.  Signs of infection (fever, growing tenderness at the surgery site, a large amount of drainage or bleeding, severe pain, foul-smelling drainage, redness, swelling).    2. It has been over 8 to 10 hours since surgery and you are still not able to urinate (pass water).    3.  Headache for over 24 hours.      With any questions or concerns, please contact Dr. Patel's clinic at #[phone] during clinic hours.  If after hours or on weekends please call the hospital  at:  '   [phone] and ask for the resident on call for gynecology   (answered 24 hours a day)  '   Emergency Department:    [facility]: [phone]       (TTY for hearing impaired: [phone])      For urgent or emergent concerns outside of clinic hours, either proceed to the nearest emergency room or contact the on-call physician by calling the [facility] at [phone] and asking to speak with the Gynecology Oncology Resident Physician on call (pager [phone])

## 2022-10-28 NOTE — ANESTHESIA PROCEDURE NOTES
Airway       Patient location during procedure: OR       Procedure Start/Stop Times: 10/28/2022 7:43 AM  Staff -        Anesthesiologist:  Neftali Contreras MD       Resident/Fellow: Mark Serrano MD       Performed By: resident  Consent for Airway        Urgency: elective  Indications and Patient Condition       Indications for airway management: cedrick-procedural       Induction type:intravenous       Mask difficulty assessment: 1 - vent by mask    Final Airway Details       Final airway type: endotracheal airway       Successful airway: ETT - single  Endotracheal Airway Details        ETT size (mm): 7.0       Cuffed: yes       Successful intubation technique: direct laryngoscopy       DL Blade Type: MAC 4       Grade View of Cords: 1       Adjucts: stylet       Position: Right       Measured from: lips       Secured at (cm): 22       Bite block used: Soft    Post intubation assessment        Placement verified by: capnometry, equal breath sounds and chest rise        Number of attempts at approach: 1       Number of other approaches attempted: 0       Secured with: pink tape       Ease of procedure: easy       Dentition: Unchanged    Medication(s) Administered   Medication Administration Time: 10/28/2022 7:43 AM

## 2022-10-28 NOTE — PROGRESS NOTES
SPIRITUAL HEALTH SERVICES  [facility]   PRE-SURGERY VISIT    Had pre-surgery visit with Elzbieta and family.  Provided spiritual support, prayer.     Moshe Ma  Chaplain Resident      * [facility] remains available 24/7 for emergent requests/referrals, either by having the switchboard page the on-call  or by entering an ASAP/STAT consult in Epic (this will also page the on-call ). Routine Epic consults receive an initial response within 24 hours.*

## 2022-10-28 NOTE — OP NOTE
[facility] - Operative Note    Pre-operative diagnosis:   Endometrial cancer  History of supracervical hysterectomy and bilateral salpingectomy  History of sacral colpopexy with mesh placement     Post-operative diagnosis: Same    Robotic-assisted laparoscopic trachelectomy (removal of the cervix), excision of mesh  Bilateral oophorectomy    Combined procedure with the following procedures by Urology  ROBOTIC ASSISTED LAPAROSCOPIC SACRCOCOLPOPEXY WITH MESH REVISION  Combined Cystoscopy,  with bilateral temporary ureteral stents    Surgeon(s) and Role:  Panel 1:     * Hayden Patel MD - Primary     * Barrington Go MD - Fellow - Assisting  Panel 2:     * Tiffani Hanks MD - Primary    Anesthesia:   General with Block     EBL: 25ml    Drains: None    Specimen(s):  ID Type Source Tests Collected by Time Destination   1 : Right and Left ovary  Tissue Other SURGICAL PATHOLOGY EXAM Hayden Patel MD 10/28/2022  8:55 AM    2 : Cervix and mesh  Tissue Cervix SURGICAL PATHOLOGY EXAM Hayden Patel MD 10/28/2022  9:39 AM        Findings: bimanual with small cervix which is mobile but tacked slightly to the patient's right. Firmness in the posterior cul-de-sac, likely mesh. Skin incisions consistent with prior surgery. Intra-abdominal survey with normal upper abdomen. Few filmy adhesions from the colon to the vaginal cuff. Normal ovaries. Prior hysterectomy. Mesh was adherent to the right pelvic sidewall in the retroperitoneum and cervix deviated to the right side.     Complications: None apparent    Implants:   Implant Name Type Inv. Item Serial No.  Lot No. LRB No. Used Action   MESH SLING FLAT RESTORELLE [id number] - [id number] Mesh MESH SLING FLAT RESTORELLE [id number]  COLOPLAST [id number] N/A 1 Implanted        Indications for procedure: This is a 67 year old who underwent supracervical hysterectomy, bilateral salpigectomy with sacrocolpopexy for pelvic organ  prolapse. On final pathology she was incidentally noted to have a FIGO Grade 1 endometrioid adenocarcinoma ([id number]). Due to concerns about the possibility of residual endometrium within the cervical stump as well as the concern over retained ovaries, the recommendation for removal of these structures was recommended.     Description of procedure: The patient was brought to the operating room where general anesthesia was induced. She was placed in the low lithotomy position. She was prepped and draped in the usual sterile fashion. Surgical time out was completed. Dr. Hanks performed cystoscopy with stent placement.     Attention was turned to the abdomen. A supra-umbilical incision was made through her prior incision and a Veress needle was placed into the abdominal cavity. Abdominal placement was confirmed using the saline drop technique and a low insertion pressure of 4 mmHg. The abdominal cavity was insufflated to 15mmHg with carbon dioxide gas. An 8mm trocar was then inserted and abdominal entry confirmed with the laparoscope. Two 8mm trocars were placed in the bilateral mid-abdomen using the prior incisions under direct visualization. An 8mm AirSeal port was placed inferior and to the right of the right mid-abdomen port under direct visualization. A final 8mm robotic port was then placed lateral to the left mid-abdomen port for the robotic 4th arm.The patient was placed in steep Trendelenburg position. The small bowel was placed in the upper abdomen. The robot was then docked.     Lysis of adhesions from the sigmoid colon to the cervix and pelvic peritoneum was performed sharply and with cautery. Dr. Hanks placed a stitch on the left uterosacral ligament for identification.    The retroperitoneum was opened parallel to the gonadal vessels. The external iliac artery was followed cephalad and the ureter was identified. The ovarian vessels were sealed and transected. The right ovary was removed from all remaining  attachments to the pelvic sidewall with cautery. The same procedure was carried out on the contralateral side. Both ovaries were placed into a 5 mm endocatch bag and left in situ to be removed after the vaginal incision.    The bladder flap was then developed with a combination of cautery and blunt dissection.  The vaginal mesh was identified and the bladder was gently dissected from the underlying mesh.  The mesh was then identified on the right pelvic sidewall and transected approximately 4cm from the cervix.  The mesh was then dissected from the right pelvic sidewall with care to avoid the right ureter. This process took approximately an hour due to the scar tissue that had already formed in this area. The mesh was dissected completely free from the right pelvic sidewall and bladder until the cervix was free from all attachments other than the mesh.  The biltateral ureters were lateralized and the rectovaginal space developed. The monopolar scissors was then used to make the colpotomy, which was carried along an EEA sizer placed into the vagina. The specimen was then removed through the vagina and we confirmed that the entire cervix had been removed. The specimen was sent to pathology. The endocatch bag with bilateral ovaries was removed through the vagina. The cuff suture was introduced into the abdomen and then a balloon was placed in the vagina to retain pneumoperitoneum.    The vaginal cuff was then closed using running sutures of 0 V-lock. The cuff was irrigated and noted to be hemostatic. All pedicles were inspected and also found to be hemostatic. The case was turned over to Dr. Hanks for sacral suspension.     At the conclusion of her portion of the case, we resumed the procedure and the pneumoperitoneum was allowed to escape and the ports were removed. The port sites were closed using 4-0 Monocryl and a sterile dressing applied. All counts were correct prior to the conclusion of the case. The waddell and  ureteral stents were removed. The patient was awakened, extubated, and taken to PACU in stable condition.     Barrington Go MD  Gynecologic Oncology Fellow Physician    Attending Attestation  I was present for and participated in the entire procedure. I agree with the procedure as documented in the note above, which I have edited as necessary.     Hayden Patel MD    Gynecologic Oncology

## 2022-10-30 ENCOUNTER — PATIENT OUTREACH (OUTPATIENT)
Dept: CARE COORDINATION | Facility: CLINIC | Age: 67
End: 2022-10-30

## 2022-10-30 NOTE — PROGRESS NOTES
"Clinic Care Coordination Contact  [facility]: Post-Discharge Note  SITUATION                                                      Admission:    Admission Date: 10/28/22   Reason for Admission: endometrial cancer  Discharge:   Discharge Date: 10/28/22  Discharge Diagnosis: endometrial cancer    BACKGROUND                                                      Per hospital discharge summary and inpatient provider notes:    67 year old POD#0 s/p RA laparoscopic trachelectomy, excision of mesh, bilateral oophorectomy, sacrocolpopexy with mesh revision, and cystoscopy    ASSESSMENT           Discharge Assessment  How are you doing now that you are home?: \"I'm doing well. I'm resting now with an ice pack on my abdomen which helps with pain. I was able to walk a little outside yesterday\"  How are your symptoms? (Red Flag symptoms escalate to triage hotline per guidelines): Improved  Do you feel your condition is stable enough to be safe at home until your provider visit?: Yes  Does the patient have their discharge instructions? : Yes  Does the patient have questions regarding their discharge instructions? : No  Were you started on any new medications or were there changes to any of your previous medications? : Yes  Does the patient have all of their medications?: Yes  Do you have questions regarding any of your medications? : No  Do you have all of your needed medical supplies or equipment (DME)?  (i.e. oxygen tank, CPAP, cane, etc.):  (NA)  Discharge follow-up appointment scheduled within 14 calendar days? : No (instructed to schedule post op follow up in 4 weeks. States her  was told her providers were going to call her to see if virtual appointments could be utilized since patient lives 3 hours away)         Post-op (Clinicians Only)  Did the patient have surgery or a procedure: Yes (s/p RA laparoscopic trachelectomy, excision of mesh, bilateral oophorectomy, sacrocolpopexy with mesh revision, and " cystoscopy)  Incision: closed  Drainage: No  Bleeding: none  Fever: No  Chills: No  Redness: No  Warmth: No  Swelling: No  Incision site pain: Yes (using ice pack and oxycodone with good relief)  Closure: none (surgical glue)  Eating & Drinking: eating and drinking without complaints/concerns  PO Intake: regular diet  Additional Symptoms:  (NA)  Bowel Function: constipation  Date of last BM: 10/26/22 (advised to start senokot as prescribed)  Urinary Status: voiding without complaint/concerns (initially had burning with urination, which she was told to expect, but this has resolved)        PLAN                                                      Outpatient Plan:  Keep appointments as scheduled    Future Appointments   Date Time Provider Department Center   11/21/2022  8:00 AM Hue Andrade PA-C [facility]   11/21/2022  1:30 PM Hayden Patel MD [facility]         For any urgent concerns, please contact our 24 hour nurse triage line: [phone] ([phone])         Karlie Huerta RN  [facility] [place]

## 2022-11-01 LAB
PATH REPORT.COMMENTS IMP SPEC: NORMAL
PATH REPORT.COMMENTS IMP SPEC: NORMAL
PATH REPORT.FINAL DX SPEC: NORMAL
PATH REPORT.GROSS SPEC: NORMAL
PATH REPORT.MICROSCOPIC SPEC OTHER STN: NORMAL
PATH REPORT.RELEVANT HX SPEC: NORMAL
PHOTO IMAGE: NORMAL

## 2022-11-21 ENCOUNTER — OFFICE VISIT (OUTPATIENT)
Dept: UROLOGY | Facility: CLINIC | Age: 67
End: 2022-11-21
Payer: COMMERCIAL

## 2022-11-21 ENCOUNTER — ONCOLOGY VISIT (OUTPATIENT)
Dept: ONCOLOGY | Facility: CLINIC | Age: 67
End: 2022-11-21
Payer: COMMERCIAL

## 2022-11-21 VITALS
DIASTOLIC BLOOD PRESSURE: 77 MMHG | SYSTOLIC BLOOD PRESSURE: 131 MMHG | WEIGHT: 143 LBS | HEIGHT: 65 IN | RESPIRATION RATE: 16 BRPM | OXYGEN SATURATION: 99 % | HEART RATE: 87 BPM | TEMPERATURE: 98.4 F | BODY MASS INDEX: 23.82 KG/M2

## 2022-11-21 DIAGNOSIS — C54.1 ENDOMETRIAL CANCER (H): Primary | ICD-10-CM

## 2022-11-21 DIAGNOSIS — Z48.89 ENCOUNTER FOR POSTOPERATIVE CARE: Primary | ICD-10-CM

## 2022-11-21 PROCEDURE — 99024 POSTOP FOLLOW-UP VISIT: CPT | Performed by: PHYSICIAN ASSISTANT

## 2022-11-21 PROCEDURE — 99207 PR MEASURE POST-VOID RESIDUAL URINE/BLADDER CAPACITY, US NON-IMAGING: CPT | Performed by: PHYSICIAN ASSISTANT

## 2022-11-21 PROCEDURE — 99214 OFFICE O/P EST MOD 30 MIN: CPT | Mod: 24 | Performed by: OBSTETRICS & GYNECOLOGY

## 2022-11-21 ASSESSMENT — PAIN SCALES - GENERAL: PAINLEVEL: NO PAIN (0)

## 2022-11-21 NOTE — NURSING NOTE
"Oncology Rooming Note    November 21, 2022 1:24 PM   Carole Grover is a 67 year old female who presents for:    Chief Complaint   Patient presents with     Oncology Clinic Visit     Post-op surgery 10/28     Initial Vitals: /77 (BP Location: Right arm)   Pulse 87   Temp 98.4  F (36.9  C) (Oral)   Resp 16   Ht 1.651 m (5' 5\")   Wt 64.9 kg (143 lb)   SpO2 99%   BMI 23.80 kg/m   Estimated body mass index is 23.8 kg/m  as calculated from the following:    Height as of this encounter: 1.651 m (5' 5\").    Weight as of this encounter: 64.9 kg (143 lb). Body surface area is 1.73 meters squared.  No Pain (0) Comment: Data Unavailable   No LMP recorded. Patient has had a hysterectomy.  Allergies reviewed: Yes  Medications reviewed: Yes    Medications: Medication refills not needed today.  Pharmacy name entered into EPIC: [id number] - [place], [place] - [address] [place] [address]    Clinical concerns: Inquires about estrogen cream- sister did have breast cancer. Also, she will need a return to work note.        Marilyn Figueroa LPN            "

## 2022-11-21 NOTE — NURSING NOTE
Carole Grover's goals for this visit include:   Chief Complaint   Patient presents with     RECHECK      4 weeks post op       She requests these members of her care team be copied on today's visit information:     post void residual:86 ml    PCP: Anna Jaramillo    Referring Provider:  Anna Jaramillo  [facility]  [address] [place] [address]    There were no vitals taken for this visit.    Do you need any medication refills at today's visit?     Liberty Jon LPN on 11/21/2022 at 7:57 AM

## 2022-11-21 NOTE — PROGRESS NOTES
November 21, 2022    Post operative visit    Patient returns today for follow up s/p robotic assisted lap trachelectomy (secondary to endometrial cancer) and sacrocolpopexy with excision of mesh and revision of mesh, bilaterall oophorectomy, and cysto with bilateral ureteral stent placement/remoal per Dr. Hanks and Dr. Patel. She is here today for her 4 week postop appointment. Denies vaginal bleeding or pain since surgery. Denies issues with voiding. Feels like she empties her bladder completely. Bowels regular with occasional constipation, so will take senna or colace prn. Denies s/s of UTI today. Patient voices no other concerns at this time.     Physical exam:  There were no vitals taken for this visit.  She is comfortable, in no distress, non-labored breathing.  Abdomen is soft, non-tender, non-distended.  Normal external female genitalia.  Negative CST. No lesions, ulcers, rashes, bleeding, discharge, pain to palpation noted on external exam. Cervix absent. Speculum obtained with no lesions, ulcers, bleeding, mesh extrusion, abnormal discharge, obvious separation, or pain to palpation.     PVR 86 mL by bladder scan    Pathology     CASE FROM [place], [place] 09/02/2022:  A.Cervix, biopsy  - Benign endocervical polyp with acute and chronic inflammation and squamous metaplasia      B. Uterus and fallopian tubes, supracervical hysterectomy and bilateral salpingectomy:  - Endometrioid adenocarcinoma, FIGO Grade 1  - Background of atypical endometrial hyperplasia  - Adenocarcinoma invades to 2 mm into myometrium (<50% of myometrial thickness)  - Unremarkable bilateral fallopian tubes   - Pathologic stage is pT1a     10/28/22  Final Diagnosis   A. Ovaries, bilateral oophorectomy:  - Ovaries with benign inclusions cysts  - Negative for atypia or malignancy     B. Cervix, robot-assisted laparoscopic trachelectomy:  - Cervix and lower uterine segment with post surgical changes  - Negative for atypia or  malignancy       A/P: 67 year old F who is 4 weeks post-op from robotic assisted lap trachelectomy (secondary to endometrial cancer) and sacrocolpopexy with excision of mesh and revision of mesh, bilaterall oophorectomy, and cysto with bilateral ureteral stent placement/remoal per Dr. Hanks and Dr. Patel.    - Continue to f/u with Amanda per their recommendations and discuss with them possible use of estrogen cream 3x/week given she had mesh placement.   - Continue post-op restrictions x2 more weeks.   - F/u prn.  - Advised patient to contact clinic for any further questions concerns in the future. After discussing the assessment and plan, patient verbalizes understanding and agrees to the above plan. All questions answered. Patient is to contact our team for any further questions/concerns in the future.     Hue Andrade PA-C  Urology    CC  Patient Care Team:  Anna Borjas as PCP - General (Family Medicine)  Hayden Patel MD as MD (Gynecologic Oncology)  Fox Zhang RN as Specialty Care Coordinator (Hematology & Oncology)  Hayden Patel MD as Assigned Cancer Care Provider  Tiffani Hanks MD as Assigned Surgical Provider  ANNA BORJAS

## 2022-11-21 NOTE — LETTER
11/21/2022         RE: Carole Grover  [address]  [facility] [address]        Dear Colleague,    Thank you for referring your patient, Carole Grover, to the [facility]. Please see a copy of my visit note below.    Gynecologic Oncology Clinic - Established Patient Visit    Visit date: Nov 21, 2022     Reason for visit: pathology review and treatment planning, endometrial cancer    Interval history: Carole Grover is a 67 year old who initially underwent supracervical hysterectomy with bilateral salpingectomy and sacral colposuspension due to pelvic organ prolapse.  Results from that surgery showed an incidental finding of T1a grade 1 endometrial cancer.  She subsequently underwent a trachelectomy, bilateral oophorectomy-excision with Dr. Cummings and vaginal vault colposuspension with mesh by Dr. Hanks in urology.     She feels that she is doing quite well since surgery.  She has no specific concerns.  She is urinating regularly.  She has no vaginal bleeding.  No incisional concerns.  She is having regular bowel movements.  Her final pathology returned with no residual carcinoma.  It did reveal lower uterine segment in addition to cervical tissue.  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      Review of Systems:  As per HPI, otherwise non-contributory.     Past Surgical History:  Past Surgical History:   Procedure Laterality Date     APPENDECTOMY       CATARACT EXTRACTION Bilateral      COMBINED CYSTOSCOPY, INSERT STENT URETER(S)  10/28/2022    Procedure: Combined Cystoscopy,  with bilateral temporary ureteral stents;  Surgeon: Tiffani Hanks MD;  Location:  OR     [facility] LAPAROSCOPIC CERVICECTOMY (TRACHELECTOMY) N/A 10/28/2022    Procedure: Robotic-assisted laparoscopic trachelectomy (removal of the cervix), excision of mesh;  Surgeon: Hayden Patel MD;  Location:  OR     [facility]  "OOPHORECTOMY Bilateral 10/28/2022    Procedure: Bilateral oophorectomy;  Surgeon: Hayden Patel MD;  Location: [facility] OR     DAVINCI SACROCOLPOPEXY, MIDURETHRAL SLING, CYSTOSCOPY N/A 10/28/2022    Procedure: ROBOTIC ASSISTED LAPAROSCOPIC SACRCOCOLPOPEXY WITH MESH REVISION;  Surgeon: Tiffani Hanks MD;  Location: [facility] OR        Physical Exam:  /77 (BP Location: Right arm)   Pulse 87   Temp 98.4  F (36.9  C) (Oral)   Resp 16   Ht 1.651 m (5' 5\")   Wt 64.9 kg (143 lb)   SpO2 99%   BMI 23.80 kg/m     General appearance: no acute distress, well groomed, sitting comfortably   GI: abdomen soft, incisions well healed    Pathology:  Component  Resulting Agency   Case Report   Surgical Pathology Report                         Case: [id number]                                   Authorizing Provider:  Hayden Patel MD         Collected:           10/28/2022 08:55 AM           Ordering Location:     [facility] OR                 Received:            10/28/2022 11:11 AM           Pathologist:           Mona Galvin MD                                                              Specimens:   A) - Other, Right and Left ovary                                                                     B) - Cervix, Cervix and mesh                                                               Final Diagnosis   A. Ovaries, bilateral oophorectomy:  - Ovaries with benign inclusions cysts  - Negative for atypia or malignancy     B. Cervix, robot-assisted laparoscopic trachelectomy:  - Cervix and lower uterine segment with post surgical changes  - Negative for atypia or malignancy   Electronically signed by Mona Galvin MD on 11/1/2022 at  5:30 PM   Clinical Information  [facility]   67 year old post-menopausal female who underwent robotic supracervical hysterectomy, salpingectomy with sacral colpopexy 9/2/2022 with an " "incidental finding of pT1a, [id number], promoter methylation negative, FIGO 1 endometrioid adenocarcinoma ([id number]; [id number]).      Case Report   Consult Report                                    Case: [id number]                                   Authorizing Provider:  Hayden Patel MD         Collected:           10/03/2022 12:08 PM           Ordering Location:     [facility]     Received:            10/03/2022 12:08 PM                                  [facility]                                                         Pathologist:           Mona Galvin MD                                                              Specimen:    Consult Slide, [id number]                                                                  Addendum   This addendum is to report on the results of MMR protein expression by IHC, which was performed at [facility] on the requested tissue block from the referring institution.      Immunohistochemical stains for MMR protein expression performed with properly reactive controls demonstrate absence of MLH-1 and PMS-2 expression in the tumor nuclei. MSH-2 and MSH-6 protein expression is intact.      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     There is absence of expression of DNA mismatch repair (MMR) proteins MLH1 and PMS2 in the tumor nuclei, which is usually due to sporadic microsatellite instability carcinoma due to promoter hypermethylation of the MLH1 gene (see \"endometrium biomarker reporting template\" below) .     MLH1 hypermethylation analysis has been ordered, and the result will be reported separately by the molecular diagnostics lab.   Addendum electronically signed by Mona Galvin MD on 10/11/2022 at  3:50 PM   Final Diagnosis   CASE FROM [place], [place] ([id number], OBTAINED 09/02/2022):  A.Cervix, biopsy  - " Benign endocervical polyp with acute and chronic inflammation and squamous metaplasia      B. Uterus and fallopian tubes, supracervical hysterectomy and bilateral salpingectomy:  - Endometrioid adenocarcinoma, FIGO Grade 1  - Background of atypical endometrial hyperplasia  - Adenocarcinoma invades to 2 mm into myometrium (<50% of myometrial thickness)  - Unremarkable bilateral fallopian tubes   - Pathologic stage is pT1a    Electronically signed by Mona Galvin MD on 10/3/2022 at  4:25 PM       RESULTS    MLH1 promoter methylation: NEGATIVE   METHODOLOGY    Tumor is macroenriched from paraffin slides, DNA is extracted, quantitated, and treated with bisulfite. Quantitative PCR amplification of MLH1 is compared to the internal control COL2A1 in the patient's sample and a known methylated DNA control. Quantification of methylated MLH1 is accomplished through generation of a standard curve and calculation of a Percent Methylation Ratio (PMR) following the published DriveK procedure. PMR values greater than or equal to 4 are interpreted as positive, and PMR = 0 is interpreted as negative. PMR values of 0.1-3.9 are equivocal and require further workup.        Assessment:  Carole is a 67 year old postmenopausal female who is now status post total hysterectomy, bilateral salpingo-oophorectomy, and vaginal vault suspension with mesh with final results showing an MMR deficient [id number] FIGO grade 1 endometrioid adenocarcinoma the uterus.    Plan:  -Endometrial cancer: We reviewed her final pathology results which show an early stage, low grade cancer. Fortunately these have an overall good prognosis, with a low rate of recurrence. As such, no adjuvant therapy (chemo/radiation) is recommended as it would not change this risk in any meaningful way. Despite the low risk of recurrence, we discussed that there is always a risk of cancer recurrence. This is highest within the first 5 years after diagnosis. The  most common site for recurrence is at the top of the vagina or within the pelvis, but it can occur anywhere. We do recommend follow-up for 5 years after this diagnosis.  -She should have surveillance visit every 6 months for the first 1-2 years and then annually to 5 years.     - These visits should include a review of symptoms including any new cough, shortness of breath, pelvic or abdominal pain, change in bladder or bowel habits, or vaginal bleeding. They should include a physical exam with lymph node assessment and a pelvic exam, preferably with rectovaginal exam to adequately assess for any evidence of cuff recurrence. Pap smears are not indicated. CT or other imaging should be reserved for instances where recurrence is suspected. Vaginal Pap smear screening is not recommended unless the patient has a history of high grade cervical dysplasia, in which case it is recommended as per ASCCP guidelines until 25 years after JOEY 2/3 treatment.  - These surveillance visits can be done by general OB/Gyn if mutually agreeable with referral back to our clinic for any concerns.    - She did inquire about the use of vaginal estrogen as a means of decreasing the risk of mesh erosion through the vagina as recommended by the SHANICE in Urology. In general this is felt to be safe even in endometrial cancer which is thought to be an estrogen driven cancer. High quality data are lacking but the data that does exist doesn't seem to suggest an increased risk of recurrence with the use of exogenous topical estrogen, although the risk of recurrence is so low at baseline it would be nearly impossible to have sufficient data to power a study for this. In general it is a balance of the risks/benefits. I will follow-up with Dr. Hanks to determine what her recommendations would be for use/duration. If it is recommended, I would be willin to prescribe this particularly for short term use.     Loss of MLH1/PMS2, MLH1 promotor methylation  negative: We discussed this results which showed loss of MLH1 and PMS2.  This is not appear to be due to hyper methylation of the MLH1 promoter given that testing for this was negative.  These findings have not thus concerning for possible Walsh syndrome however they can still be caused by somatic loss of both MLH1 and PMS2.  Regardless this constellation of findings warrants referral to genetic counseling for germline testing.  I have placed the referral and discussed this with the patient.    I spent a total of 45 minutes with Carole Grover during today's office visit. Of this, 5 minutes were spent in post-op discussion and routine post-op care. The remainder of the time was spent in discussion of plan related to her pathology results and counseling regarding the risks/benefits/alternatives and recommended follow-up related to her diagnosis.    Hayden Patel MD     Gynecologic Oncology       Again, thank you for allowing me to participate in the care of your patient.        Sincerely,        Hayden Patel MD

## 2022-11-21 NOTE — LETTER
November 21, 2022        To Whom It May Concern:     Carole Grover had surgery with me. She may return to work on 12/26/2022 without any restrictions.          If you have any questions or concerns you may contact our office.     Sincerely,      Hayden Patel MD

## 2022-11-22 NOTE — PROGRESS NOTES
Gynecologic Oncology Clinic - Established Patient Visit    Visit date: Nov 21, 2022     Reason for visit: pathology review and treatment planning, endometrial cancer    Interval history: Carole Grover is a 67 year old who initially underwent supracervical hysterectomy with bilateral salpingectomy and sacral colposuspension due to pelvic organ prolapse.  Results from that surgery showed an incidental finding of T1a grade 1 endometrial cancer.  She subsequently underwent a trachelectomy, bilateral oophorectomy-excision with Dr. Cummings and vaginal vault colposuspension with mesh by Dr. Hanks in urology.     She feels that she is doing quite well since surgery.  She has no specific concerns.  She is urinating regularly.  She has no vaginal bleeding.  No incisional concerns.  She is having regular bowel movements.  Her final pathology returned with no residual carcinoma.  It did reveal lower uterine segment in addition to cervical tissue.  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      Review of Systems:  As per HPI, otherwise non-contributory.     Past Surgical History:  Past Surgical History:   Procedure Laterality Date     APPENDECTOMY       CATARACT EXTRACTION Bilateral      COMBINED CYSTOSCOPY, INSERT STENT URETER(S)  10/28/2022    Procedure: Combined Cystoscopy,  with bilateral temporary ureteral stents;  Surgeon: Tiffani Hanks MD;  Location: [facility] OR     DAVINCI LAPAROSCOPIC CERVICECTOMY (TRACHELECTOMY) N/A 10/28/2022    Procedure: Robotic-assisted laparoscopic trachelectomy (removal of the cervix), excision of mesh;  Surgeon: Hayden Patel MD;  Location: [facility] OR     DAVINCI OOPHORECTOMY Bilateral 10/28/2022    Procedure: Bilateral oophorectomy;  Surgeon: Hayden Patel MD;  Location: [facility] OR     DAVINCI SACROCOLPOPEXY, MIDURETHRAL SLING, CYSTOSCOPY N/A 10/28/2022    Procedure: ROBOTIC ASSISTED LAPAROSCOPIC SACRCOCOLPOPEXY WITH MESH  "REVISION;  Surgeon: Tiffani Hanks MD;  Location: [facility] OR        Physical Exam:  /77 (BP Location: Right arm)   Pulse 87   Temp 98.4  F (36.9  C) (Oral)   Resp 16   Ht 1.651 m (5' 5\")   Wt 64.9 kg (143 lb)   SpO2 99%   BMI 23.80 kg/m     General appearance: no acute distress, well groomed, sitting comfortably   GI: abdomen soft, incisions well healed    Pathology:  Component  Resulting Agency   Case Report   Surgical Pathology Report                         Case: [id number]                                   Authorizing Provider:  Hayden Patel MD         Collected:           10/28/2022 08:55 AM           Ordering Location:     [facility]                 Received:            10/28/2022 11:11 AM           Pathologist:           Mona Galvin MD                                                              Specimens:   A) - Other, Right and Left ovary                                                                     B) - Cervix, Cervix and mesh                                                               Final Diagnosis   A. Ovaries, bilateral oophorectomy:  - Ovaries with benign inclusions cysts  - Negative for atypia or malignancy     B. Cervix, robot-assisted laparoscopic trachelectomy:  - Cervix and lower uterine segment with post surgical changes  - Negative for atypia or malignancy   Electronically signed by Mona Galvin MD on 11/1/2022 at  5:30 PM   Clinical Information  [facility]   67 year old post-menopausal female who underwent robotic supracervical hysterectomy, salpingectomy with sacral colpopexy 9/2/2022 with an incidental finding of pT1a, [id number], promoter methylation negative, FIGO 1 endometrioid adenocarcinoma ([id number]; [id number]).      Case Report   Consult Report                                    Case: [id number]                                 " "  Authorizing Provider:  Hayden Patel MD         Collected:           10/03/2022 12:08 PM           Ordering Location:     [facility]     Received:            10/03/2022 12:08 PM                                  Molecular Diagnostics                                                         Pathologist:           Mona Galvin MD                                                              Specimen:    Consult Slide, [id number]                                                                  Addendum   This addendum is to report on the results of MMR protein expression by IHC, which was performed at [facility] on the requested tissue block from the referring institution.      Immunohistochemical stains for MMR protein expression performed with properly reactive controls demonstrate absence of MLH-1 and PMS-2 expression in the tumor nuclei. MSH-2 and MSH-6 protein expression is intact.      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     There is absence of expression of DNA mismatch repair (MMR) proteins MLH1 and PMS2 in the tumor nuclei, which is usually due to sporadic microsatellite instability carcinoma due to promoter hypermethylation of the MLH1 gene (see \"endometrium biomarker reporting template\" below) .     MLH1 hypermethylation analysis has been ordered, and the result will be reported separately by the molecular diagnostics lab.   Addendum electronically signed by Mona Galvin MD on 10/11/2022 at  3:50 PM   Final Diagnosis   CASE FROM [place], [place] ([id number], OBTAINED 09/02/2022):  A.Cervix, biopsy  - Benign endocervical polyp with acute and chronic inflammation and squamous metaplasia      B. Uterus and fallopian tubes, supracervical hysterectomy and bilateral salpingectomy:  - Endometrioid adenocarcinoma, FIGO Grade 1  - Background of atypical endometrial " hyperplasia  - Adenocarcinoma invades to 2 mm into myometrium (<50% of myometrial thickness)  - Unremarkable bilateral fallopian tubes   - Pathologic stage is pT1a    Electronically signed by Mona Galvin MD on 10/3/2022 at  4:25 PM       RESULTS    MLH1 promoter methylation: NEGATIVE   METHODOLOGY    Tumor is macroenriched from paraffin slides, DNA is extracted, quantitated, and treated with bisulfite. Quantitative PCR amplification of MLH1 is compared to the internal control COL2A1 in the patient's sample and a known methylated DNA control. Quantification of methylated MLH1 is accomplished through generation of a standard curve and calculation of a Percent Methylation Ratio (PMR) following the published [employer] procedure. PMR values greater than or equal to 4 are interpreted as positive, and PMR = 0 is interpreted as negative. PMR values of 0.1-3.9 are equivocal and require further workup.        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    Plan:  -Endometrial cancer: We reviewed her final pathology results which show an early stage, low grade cancer. Fortunately these have an overall good prognosis, with a low rate of recurrence. As such, no adjuvant therapy (chemo/radiation) is recommended as it would not change this risk in any meaningful way. Despite the low risk of recurrence, we discussed that there is always a risk of cancer recurrence. This is highest within the first 5 years after diagnosis. The most common site for recurrence is at the top of the vagina or within the pelvis, but it can occur anywhere. We do recommend follow-up for 5 years after this diagnosis.  -She should have surveillance visit every 6 months for the first 1-2 years and then  annually to 5 years.     - These visits should include a review of symptoms including any new cough, shortness of breath, pelvic or abdominal pain, change in bladder or bowel habits, or vaginal bleeding. They should include a physical exam with lymph node assessment and a pelvic exam, preferably with rectovaginal exam to adequately assess for any evidence of cuff recurrence. Pap smears are not indicated. CT or other imaging should be reserved for instances where recurrence is suspected. Vaginal Pap smear screening is not recommended unless the patient has a history of high grade cervical dysplasia, in which case it is recommended as per ASCCP guidelines until 25 years after JOEY 2/3 treatment.  - These surveillance visits can be done by general OB/Gyn if mutually agreeable with referral back to our clinic for any concerns.    - She did inquire about the use of vaginal estrogen as a means of decreasing the risk of mesh erosion through the vagina as recommended by the SHANICE in Urology. In general this is felt to be safe even in endometrial cancer which is thought to be an estrogen driven cancer. High quality data are lacking but the data that does exist doesn't seem to suggest an increased risk of recurrence with the use of exogenous topical estrogen, although the risk of recurrence is so low at baseline it would be nearly impossible to have sufficient data to power a study for this. In general it is a balance of the risks/benefits. I will follow-up with Dr. Hanks to determine what her recommendations would be for use/duration. If it is recommended, I would be willin to prescribe this particularly for short term use.     Loss of MLH1/PMS2, MLH1 promotor methylation negative: We discussed this results which showed loss of MLH1 and PMS2.  This is not appear to be due to hyper methylation of the MLH1 promoter given that testing for this was negative.  These findings have not thus concerning for possible Walsh syndrome however  they can still be caused by somatic loss of both MLH1 and PMS2.  Regardless this constellation of findings warrants referral to genetic counseling for germline testing.  I have placed the referral and discussed this with the patient.    I spent a total of 45 minutes with Carole ISELA Grover during today's office visit. Of this, 5 minutes were spent in post-op discussion and routine post-op care. The remainder of the time was spent in discussion of plan related to her pathology results and counseling regarding the risks/benefits/alternatives and recommended follow-up related to her diagnosis.    Hayden Patel MD     Gynecologic Oncology

## 2023-02-16 ENCOUNTER — VIRTUAL VISIT (OUTPATIENT)
Dept: ONCOLOGY | Facility: CLINIC | Age: 68
End: 2023-02-16
Attending: GENETIC COUNSELOR, MS
Payer: COMMERCIAL

## 2023-02-16 DIAGNOSIS — Z80.3 FAMILY HISTORY OF MALIGNANT NEOPLASM OF BREAST: ICD-10-CM

## 2023-02-16 DIAGNOSIS — C54.1 ENDOMETRIAL CANCER (H): Primary | ICD-10-CM

## 2023-02-16 DIAGNOSIS — Z80.52 FAMILY HISTORY OF BLADDER CANCER: ICD-10-CM

## 2023-02-16 PROCEDURE — 96040 HC GENETIC COUNSELING, EACH 30 MINUTES: CPT | Mod: TEL,95 | Performed by: GENETIC COUNSELOR, MS

## 2023-02-16 NOTE — PATIENT INSTRUCTIONS
Assessing Cancer Risk  Cancer is a common diagnosis which impacts many families.  Individuals may develop cancer due to environmental factors (such as exposures and lifestyle), aging, genetic predisposition, or a combination of these factors.      Approximately 5-10% of cancer diagnoses are thought to be caused by inherited risk factors.  These families often have:  Several people with the same or related types of cancer  Cancers diagnosed at a young age (before age 50)  Individuals with more than one primary cancer  Multiple generations of the family affected with cancer    Walsh Syndrome Genes  Many of the genes we are born with impact our risk of cancer.  When one of these genes is not working properly due to a mistake (known as a  mutation  or  variant ), this may lead to an increased risk of developing certain types of cancer.      There are currently five genes known to cause Walsh Syndrome: MLH1, MSH2, MSH6, PMS2, and EPCAM.  A single mutation in one of the Walsh Syndrome genes increases the risk of developing several types of cancers, including colon, endometrial, ovarian, and stomach.  Other cancers that can be seen in some families include pancreatic, bladder, biliary tract, urothelial, small bowel, prostate, breast and brain cancers.  The table below lists the chance that a person with Walsh syndrome would develop cancer in their lifetime1.  Of note, exact risks differ between each gene.        Lifetime Cancer Risks    General Population Walsh Syndrome   Colon 4.2% 8.7-61%   Endometrial 3.1% 13-57%   Stomach <1% up to 16%   Ovarian 1.3% up-38%   Additional cancer risks may include renal pelvis, ureter, bladder, small bowel, pancreas, biliary tract, prostate, breast, brain, skin      Inheriting a mutation does not mean a person will develop cancer, but it does significantly increase their risk of certain cancers above the general population risk.    Medical Management  If a harmful mutation is found in  a Walsh syndrome gene, there may be increased cancer surveillance or risk reducing surgeries that can be offered. While cancer screening and medical management recommendations are based on genetic, personal and family history factors, the following guidelines may be recommended for some families with Walsh syndrome 1:  Colorectal: Colonoscopy screening may start as early as age 20-25 (or earlier based on family history), and repeated every 1-3 years.  The age to start and frequency of screening depends on the specific gene.     Endometrial: Hysterectomy (removal of the uterus) can be considered to reduce the risk of endometrial cancer. Screening via endometrial biopsy every 1-2 years (beginning at age 30-35) can be considered. In post-menopausal women, transvaginal ultrasound may also be considered.  Ovarian: Bilateral salpingo-oophorectomy (removal of both ovaries and fallopian tubes) may reduce the chance to develop ovarian cancer, and may be considered depending on the specific Walsh syndrome gene mutation. While there currently are no effective screening method for ovarian cancer, transvaginal ultrasound and a CA-125 blood test may be considered at the discretion of each individual's clinician.   Stomach: Upper gastrointestinal surveillance, including upper endoscopy (EGD) with extended duodenoscopy, may be performed in conjunction with colonoscopy.  This screening may start at age 30-40, and repeated every 2-4 years. This recommendation is largely dependent on family history, genetic, and other factors.  Urinary Tract: There is limited evidence to suggest a screening strategy for urothelial/urinary tract cancers. Annual urinalysis starting at age 30-35 can be considered. Consideration for screening is largely dependent upon personal and family history factors, as well as the specific Walsh Syndrome mutation.   Brain: Individuals at risk for brain cancer are encouraged to be aware of the signs and symptoms of  neurologic cancer, and should promptly report abnormal symptoms to their managing physicians.     Pancreas: Pancreatic cancer screening may be considered for some families.  This screening is typically done with contrast-enhanced MRI/ magnetic resonance cholangiopancreatography (MRCP) and/or endoscopic ultrasound (EUS).   Prostate: There is limited evidence to recommend early or more frequent screening for prostate cancer. However, individuals at risk for prostate cancer should follow screening as recommended in the NCCN Guidelines for Prostate Early Detection. This screening should be discussed with each individual's medical provider.  Skin: Due to the increased prevalence of both malignant and benign skin tumors in some Walsh syndrome genes (such as sebaceous adenocarcinomas and keratoacanthomas), skin exams may be considered every 1-2 years.     These recommendations vary depending on the specific gene identified, as cancer risks differ between each Walsh syndrome gene.  These recommendations should be discussed with an individuals genetic counselor and managing providers.      Inheritance   Walsh Syndrome mutations are inherited in an autosomal dominant pattern.  This means that if a parent has a mutation, each of their children will have a 50% chance of inheriting that same mutation.  Therefore, each child--male or female--would have a 50% chance of being at increased risk for developing cancer.    In rare situations in which both parents have a mutation in the same Walsh syndrome gene, their children each have a 25% risk for constitutional mismatch repair deficiency syndrome (CMMRD). CMMRD is a rare autosomal recessive disorder associated with cafe-au-lait spots and risk for childhood cancers. For individuals of childbearing age with Walsh syndrome mutations, genetic counseling and genetic testing may be advised for their partners.    Tumor Screening for Walsh Syndrome  There are two different tests that can  be done on a person s colon or endometrial tumor as an initial screen for Walsh Syndrome. These tests are called IHC (immunohistochemistry) and MSI (microsatellite instability). Tumors that show an absent protein on IHC or an unstable MSI result could be due to a mutation in one of the known Walsh Syndrome genes. The IHC results can also guide further genetic testing for Walsh Syndrome by indicating which gene should be tested.     Genetic Testing  For some families, genetic testing may help to explain why their cancer developed, provide tailored management options, and clarify the risk of developing cancer in the future.      Genetic testing involves a simple blood or saliva test and will look at the genetic information in select genes for variants associated with cancer risk.  This testing may include analysis of a single gene due to a known variant in the family, multiple genes most associated with the cancers in a family, or an expanded panel of genes related to many types of cancers.    Results  There are several possible genetic test results, including:   Positive--a harmful mutation (also known as a  pathogenic  or  likely pathogenic  variant) was identified in a gene associated with increased cancer risk.  These risks, as well as medical management options, depend on the specific genetic variant identified.    Negative--no variants were identified in the genes analyzed   Variant of unknown significance--a variant was identified in one or more genes, though it is currently unclear how this impacts cancer risk in the family.  Genetic testing labs are working to collect evidence about these uncertain variants and may provide updates in the future.    Genetic Information Nondiscrimination Act (JUANY)  The Genetic Information Nondiscrimination Act of 2008 (JUANY) is a federal law that protects individuals from health insurance or employment discrimination based on a genetic test result alone (with some exceptions,  including employers with fewer than 15 employees, and ).  However, JUANY's protection does not cover life insurance, long term care, or disability insurances.  The National Human [employer] [place] is a great resource to learn more.    Questions to Think About Regarding Genetic Testing  What effect will the test result have on me and my relationship with my family members if I have an inherited gene mutation?  If I don t have a gene mutation?  Should I share my test results, and how will my family react to this news, which may also affect them?  Are my children ready to learn new information that may one day affect their own health?    Resources  Walsh Syndrome International [email or web address]   Walsh Syndrome Screening Network lynchscreening.net   American Cancer Society (ACS) cancer.org   National Cancer [place] (NCI) cancer.gov     Please call us if you have any questions or concerns.   Cancer Risk Management Program (Appointments: [phone])      References  National Comprehensive Cancer Network. Clinical practice guidelines in oncology, colorectal cancer screening. Available online (registration required). 2022.

## 2023-02-16 NOTE — LETTER
Cancer Risk Management  Program Locations    [facility]  [facility]  [facility]  [facility]  Mailing Address  Cancer Risk Management Program  [address]  [place] [address]    New patient appointments  [phone]  February 16, 2023    Carole Grover  [address]   [facility] [id number]      Dear Carole,    It was a pleasure speaking with you on the phone on 2/16/2023.  Here is a copy of the progress note from our discussion. If you have any additional questions, please feel free to call.    Referring Provider: Hayden Patel MD    Present for Today's Visit: Carole    Presenting Information:   I met with Carole Lenore today for genetic counseling (telephone visit due to covid19) to discuss her personal and family history of cancer.  She is here today to review this history, cancer screening recommendations, and available genetic testing options.    Personal History:  Carole is a 67 year old female. She was diagnosed with an uterine cancer in September 2022. She underwent supracervical hysterectomy with bilateral salpingectomy and sacral colposuspension due to pelvic organ prolapse.  Results from that surgery showed an incidental finding of T1a grade 1 endometrial cancer (endometrioid adenocarcinoma). She subsequently underwent a trachelectomy, bilateral oophorectomy-excision with Dr. Cummings and vaginal vault colposuspension with mesh by Dr. Hanks in urology. No further treatment was required. 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       She had her first menstrual period at age 16 or 17, her first child at age 22, and reports that she went through menopause in her late 50's. She reports past history of oral  contraceptive use for about 5 years in her 20's and that she has never been on hormone replacement therapy.      Her most recent mammogram in February 2022 was normal and her breast density was reported as scattered fibroglandular densities. Most recent colonoscopy in 2018 was normal and follow-up was recommended in 10 years. She reports that her first colonoscopy in 2007 was normal as well. She has not yet started colonoscopy screening given her age. She does not regularly do any other cancer screening at this time.  Carole reported no tobacco use, and about 1 drink per night for alcohol use.    Family History: Cancer family history and pertinent information reviewed below (Please see scanned pedigree for detailed family history information)    Brother, age 70, has a history of bladder cancer diagnosed at age 68. She reports that he was not a smoker but did work in a factory setting.     Sister, age 68, has a history of breast cancer diagnosed in her 20's (treatment included lumpectomy).     Niece with a history of a teratoma of the ovary at age 16.     There is no known Ashkenazi Anabaptist ancestry on either side of her family. There is no reported consanguinity.    Discussion:    Carole's personal and family history of cancer is suggestive of a hereditary cancer syndrome.    We reviewed the features of sporadic, familial, and hereditary cancers. In looking at Carole's family history, it is possible that a cancer susceptibility gene is present due to her endometrial cancer that showed loss of MLH1 and PMS2 with normal MLH1 promoter hypermethylation as well as her sister's early-onset breast cancer diagnosis.    We discussed the natural history and genetics of hereditary cancers. A detailed handout regarding the information we discussed will be sent to Carole via [employer] and in [place] mail. Topics included: inheritance pattern, cancer risks, cancer screening recommendations, and also risks, benefits and limitations of  testing.  IHC testing of Carole's endometrial tumor showed absence of the following protein(s): MLH1 and PMS2. 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  It is less likely but also possible there could be a mutation in the PMS2 gene.    Therefore, it is recommended that Carole consider genetic blood testing to analyze the Walsh syndrome genes by sequencing and deletion/duplication analysis.        Walsh syndrome can be caused by a mutation in one of five genes:  MLH1, MSH2, MSH6, PMS2, and EPCAM.  Walsh syndrome may present with polyps, but typically a small number.  The highest cancer risks associated with Walsh syndrome include colon cancer, endometrial/uterine cancer, gastric cancer, and ovarian cancer.  Given her sister's history of early-onset breast cancer, we also discussed the BRCA1/2 genes. We reviewed that the most common cause of hereditary breast cancer is Hereditary Breast and Ovarian Cancer (HBOC) syndrome, which is caused by mutations in the genes BRCA1 and BRCA2. Women with a mutation in either of these genes are at increased risk for breast and ovarian cancer. There is also an increased risk for a second primary breast cancer for women. Men with a mutation in either BRCA1 or BRCA2 are at increased risk for male breast and prostate cancer. Both women and men may also be at increased risk for pancreatic cancer and melanoma.     Based on her personal and family history, Carole meets current National Comprehensive Cancer Network (NCCN) criteria for genetic testing of Walsh syndrome as well as high-penetrance breast cancer susceptibility genes (including BRCA1, BRCA2, CDH1, PALB2, PTEN, and TP53).      We discussed that there are additional genes that could cause increased risk for breast, gynecologic, and related cancers. As many of these genes present with overlapping features in a family and accurate cancer risk cannot always be established based  upon the pedigree analysis alone, it would be reasonable for Carole to consider panel genetic testing to analyze multiple genes at once.    We reviewed genetic testing options given Carole's personal and family history including targeted and expanded options. After our discussion, Carole opted to proceed with genetic testing via Walsh syndrome analysis with automatic reflex to the Common Hereditary Cancers panel through [facility].   Genetic testing is available for 47 genes associated with cancers of the breast, ovary, uterus, prostate and gastrointestinal system: [facility] Common Hereditary Cancers panel (APC, DALLAS, AXIN2, BARD1, BMPR1A, BRCA1, BRCA2, BRIP1, CDH1, CDK4, CDKN2A, CHEK2, CTNNA1, DICER1, EPCAM, GREM1, HOXB13, KIT, MEN1, MLH1, MSH2, MSH3, MSH6, MUTYH, NBN, NF1, NTHL1, PALB2, PDGFRA, PMS2, POLD1, POLE, PTEN,RAD50, RAD51C, RAD51D, SDHA, SDHB, SDHC, SDHD, SMAD4, SMARCA4, STK11, TP53,TSC1, TSC2, VHL).    We discussed that many of these genes are associated with specific hereditary cancer syndromes and published management guidelines: Hereditary Breast and Ovarian Cancer syndrome (BRCA1, BRCA2), Walsh syndrome (MLH1, MSH2, MSH6, PMS2, EPCAM), Familial Adenomatous Polyposis (APC), Hereditary Diffuse Gastric Cancer (CDH1), Familial Atypical Multiple Mole Melanoma syndrome (CDK4, CDKN2A), Multiple Endocrine Neoplasia type 1 (MEN1), Juvenile Polyposis syndrome (BMPR1A, SMAD4), Cowden syndrome (PTEN), Li Fraumeni syndrome (TP53), Hereditary Paraganglioma and Pheochromocytoma syndrome (SDHA, SDHB, SDHC, SDHD), Peutz-Jeghers syndrome (STK11), MUTYH Associated Polyposis (MUTYH), Tuberous sclerosis complex (TSC1, TSC2), Von Hippel-Lindau disease (VHL), and Neurofibromatosis type 1 (NF1).   The DALLAS, AXIN2, BRIP1, CHEK2, GREM1, MSH3, NBN, NTHL1, PALB2, POLD1, POLE, RAD51C, and RAD51D genes are associated with increased cancer risk and have published management guidelines for certain cancers.   The remaining genes (BARD1,  CTNNA1, DICER1, HOXB13, KIT, PDGFRA, RAD50, and SMARCA4) are associated with increased cancer risk and may allow us to make medical recommendations when mutations are identified.     Consent was obtained over the phone with no witness required due to the current covid19 global pandemic.    Medical Management: For Carole, we reviewed that the information from genetic testing may determine:    additional cancer screening for which Carole may qualify (i.e. mammogram and breast MRI, more frequent colonoscopies, more frequent dermatologic exams, etc.),    options for risk reducing surgeries Carole could consider (i.e. bilateral mastectomy, etc.),      and targeted chemotherapies for Carole if she were to develop certain cancers in the future (i.e. immunotherapy for individuals with Walsh syndrome, PARP inhibitors, etc.).     These recommendations will be discussed in detail once genetic testing is completed.     Carole opted to have a salivia kit sent to her home to complete the genetic testing. A kit was ordered from [employer] and a saliva collection kit will be sent to Carole's home address. I confirmed with Carole that the address we have on file is her current and correct address. We discussed some of the limitations of completing genetic testing via saliva and Carole was instructed to follow the instruction provided in the kit to ensure the best possibility of success for saliva genetic testing.     Plan:  1) Today Carole elected to proceed with Walsh syndrome analysis with automatic reflex to Common Hereditary Cancer panel genetic testing (47 genes) through [facility].  2) This information should be available in approximately 3 weeks.  3) Carole will be contacted by our scheduling department to set up a result disclosure appointment.     Karla Rolle MS, [facility]  Licensed, Certified Genetic Counselor  [facility]  [email or web address]      Assessing Cancer Risk  Cancer is a common  diagnosis which impacts many families.  Individuals may develop cancer due to environmental factors (such as exposures and lifestyle), aging, genetic predisposition, or a combination of these factors.      Approximately 5-10% of cancer diagnoses are thought to be caused by inherited risk factors.  These families often have:    Several people with the same or related types of cancer    Cancers diagnosed at a young age (before age 50)    Individuals with more than one primary cancer    Multiple generations of the family affected with cancer    Walsh Syndrome Genes  Many of the genes we are born with impact our risk of cancer.  When one of these genes is not working properly due to a mistake (known as a  mutation  or  variant ), this may lead to an increased risk of developing certain types of cancer.      There are currently five genes known to cause Walsh Syndrome: MLH1, MSH2, MSH6, PMS2, and EPCAM.  A single mutation in one of the Walsh Syndrome genes increases the risk of developing several types of cancers, including colon, endometrial, ovarian, and stomach.  Other cancers that can be seen in some families include pancreatic, bladder, biliary tract, urothelial, small bowel, prostate, breast and brain cancers.  The table below lists the chance that a person with Walsh syndrome would develop cancer in their lifetime1.  Of note, exact risks differ between each gene.        Lifetime Cancer Risks    General Population Walsh Syndrome   Colon 4.2% 8.7-61%   Endometrial 3.1% 13-57%   Stomach <1% up to 16%   Ovarian 1.3% up-38%   Additional cancer risks may include renal pelvis, ureter, bladder, small bowel, pancreas, biliary tract, prostate, breast, brain, skin      Inheriting a mutation does not mean a person will develop cancer, but it does significantly increase their risk of certain cancers above the general population risk.    Medical Management  If a harmful mutation is found in a Walsh syndrome gene, there may be  increased cancer surveillance or risk reducing surgeries that can be offered. While cancer screening and medical management recommendations are based on genetic, personal and family history factors, the following guidelines may be recommended for some families with Walsh syndrome 1:    Colorectal: Colonoscopy screening may start as early as age 20-25 (or earlier based on family history), and repeated every 1-3 years.  The age to start and frequency of screening depends on the specific gene.       Endometrial: Hysterectomy (removal of the uterus) can be considered to reduce the risk of endometrial cancer. Screening via endometrial biopsy every 1-2 years (beginning at age 30-35) can be considered. In post-menopausal women, transvaginal ultrasound may also be considered.    Ovarian: Bilateral salpingo-oophorectomy (removal of both ovaries and fallopian tubes) may reduce the chance to develop ovarian cancer, and may be considered depending on the specific Walsh syndrome gene mutation. While there currently are no effective screening method for ovarian cancer, transvaginal ultrasound and a CA-125 blood test may be considered at the discretion of each individual's clinician.     Stomach: Upper gastrointestinal surveillance, including upper endoscopy (EGD) with extended duodenoscopy, may be performed in conjunction with colonoscopy.  This screening may start at age 30-40, and repeated every 2-4 years. This recommendation is largely dependent on family history, genetic, and other factors.    Urinary Tract: There is limited evidence to suggest a screening strategy for urothelial/urinary tract cancers. Annual urinalysis starting at age 30-35 can be considered. Consideration for screening is largely dependent upon personal and family history factors, as well as the specific Walsh Syndrome mutation.     Brain: Individuals at risk for brain cancer are encouraged to be aware of the signs and symptoms of neurologic cancer, and  should promptly report abnormal symptoms to their managing physicians.       Pancreas: Pancreatic cancer screening may be considered for some families.  This screening is typically done with contrast-enhanced MRI/ magnetic resonance cholangiopancreatography (MRCP) and/or endoscopic ultrasound (EUS).     Prostate: There is limited evidence to recommend early or more frequent screening for prostate cancer. However, individuals at risk for prostate cancer should follow screening as recommended in the NCCN Guidelines for Prostate Early Detection. This screening should be discussed with each individual's medical provider.    Skin: Due to the increased prevalence of both malignant and benign skin tumors in some Walsh syndrome genes (such as sebaceous adenocarcinomas and keratoacanthomas), skin exams may be considered every 1-2 years.     These recommendations vary depending on the specific gene identified, as cancer risks differ between each Walsh syndrome gene.  These recommendations should be discussed with an individuals genetic counselor and managing providers.      Inheritance   Walsh Syndrome mutations are inherited in an autosomal dominant pattern.  This means that if a parent has a mutation, each of their children will have a 50% chance of inheriting that same mutation.  Therefore, each child--male or female--would have a 50% chance of being at increased risk for developing cancer.    In rare situations in which both parents have a mutation in the same Walsh syndrome gene, their children each have a 25% risk for constitutional mismatch repair deficiency syndrome (CMMRD). CMMRD is a rare autosomal recessive disorder associated with cafe-au-lait spots and risk for childhood cancers. For individuals of childbearing age with Walsh syndrome mutations, genetic counseling and genetic testing may be advised for their partners.    Tumor Screening for Walsh Syndrome  There are two different tests that can be done on a  person s colon or endometrial tumor as an initial screen for Walsh Syndrome. These tests are called IHC (immunohistochemistry) and MSI (microsatellite instability). Tumors that show an absent protein on IHC or an unstable MSI result could be due to a mutation in one of the known Walsh Syndrome genes. The IHC results can also guide further genetic testing for Walsh Syndrome by indicating which gene should be tested.     Genetic Testing  For some families, genetic testing may help to explain why their cancer developed, provide tailored management options, and clarify the risk of developing cancer in the future.      Genetic testing involves a simple blood or saliva test and will look at the genetic information in select genes for variants associated with cancer risk.  This testing may include analysis of a single gene due to a known variant in the family, multiple genes most associated with the cancers in a family, or an expanded panel of genes related to many types of cancers.    Results  There are several possible genetic test results, including:     Positive--a harmful mutation (also known as a  pathogenic  or  likely pathogenic  variant) was identified in a gene associated with increased cancer risk.  These risks, as well as medical management options, depend on the specific genetic variant identified.      Negative--no variants were identified in the genes analyzed     Variant of unknown significance--a variant was identified in one or more genes, though it is currently unclear how this impacts cancer risk in the family.  Genetic testing labs are working to collect evidence about these uncertain variants and may provide updates in the future.    Genetic Information Nondiscrimination Act (JUANY)  The Genetic Information Nondiscrimination Act of 2008 (JUANY) is a federal law that protects individuals from health insurance or employment discrimination based on a genetic test result alone (with some exceptions,  including employers with fewer than 15 employees, and ).  However, JUANY's protection does not cover life insurance, long term care, or disability insurances.  The National Human [employer] [place] is a great resource to learn more.    Questions to Think About Regarding Genetic Testing    What effect will the test result have on me and my relationship with my family members if I have an inherited gene mutation?  If I don t have a gene mutation?    Should I share my test results, and how will my family react to this news, which may also affect them?    Are my children ready to learn new information that may one day affect their own health?    Resources  Walsh Syndrome International [employer]   Walsh Syndrome Screening Network [email or web address].net   American Cancer Society (ACS) cancer.org   National Cancer [place] (NCI) cancer.gov     Please call us if you have any questions or concerns.   Cancer Risk Management Program (Appointments: [phone])    References  1. National Comprehensive Cancer Network. Clinical practice guidelines in oncology, colorectal cancer screening. Available online (registration required). 2022.

## 2023-02-16 NOTE — LETTER
2/16/2023         RE: Carole Grover  [address]  [facility] [address]        Dear Colleague,    Thank you for referring your patient, Carole Grover, to the [facility]. Please see a copy of my visit note below.    Elzbieta is a 67 year old who is being evaluated via a billable telephone visit.      What phone number would you like to be contacted at? [phone]  How would you like to obtain your AVS? MyChart  Distant Location (provider location):  Off-site  Phone call duration: 57 minutes    2/16/2023    Referring Provider: Hayden Patel MD    Present for Today's Visit: Carole    Presenting Information:   I met with Carole Grover today for genetic counseling (telephone visit due to covid19) to discuss her personal and family history of cancer.  She is here today to review this history, cancer screening recommendations, and available genetic testing options.    Personal History:  Carole is a 67 year old female. She was diagnosed with an uterine cancer in September 2022. She underwent supracervical hysterectomy with bilateral salpingectomy and sacral colposuspension due to pelvic organ prolapse.  Results from that surgery showed an incidental finding of T1a grade 1 endometrial cancer (endometrioid adenocarcinoma). She subsequently underwent a trachelectomy, bilateral oophorectomy-excision with Dr. Cummings and vaginal vault colposuspension with mesh by Dr. Hanks in urology. No further treatment was required. 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       She had her first menstrual period at age 16 or 17, her first child at age 22, and reports that she went through menopause in her late 50's. She reports past history of oral contraceptive use for about 5 years in her 20's and that she has never been on hormone replacement therapy.      Her most recent mammogram in  February 2022 was normal and her breast density was reported as scattered fibroglandular densities. Most recent colonoscopy in 2018 was normal and follow-up was recommended in 10 years. She reports that her first colonoscopy in 2007 was normal as well. She has not yet started colonoscopy screening given her age. She does not regularly do any other cancer screening at this time.  Carole reported no tobacco use, and about 1 drink per night for alcohol use.    Family History: Cancer family history and pertinent information reviewed below (Please see scanned pedigree for detailed family history information)    Brother, age 70, has a history of bladder cancer diagnosed at age 68. She reports that he was not a smoker but did work in a factory setting.     Sister, age 68, has a history of breast cancer diagnosed in her 20's (treatment included lumpectomy).     Niece with a history of a teratoma of the ovary at age 16.     There is no known Ashkenazi Hindu ancestry on either side of her family. There is no reported consanguinity.    Discussion:    Carole's personal and family history of cancer is suggestive of a hereditary cancer syndrome.    We reviewed the features of sporadic, familial, and hereditary cancers. In looking at Carole's family history, it is possible that a cancer susceptibility gene is present due to her endometrial cancer that showed loss of MLH1 and PMS2 with normal MLH1 promoter hypermethylation as well as her sister's early-onset breast cancer diagnosis.    We discussed the natural history and genetics of hereditary cancers. A detailed handout regarding the information we discussed will be sent to Carole via [employer] and in [place] mail. Topics included: inheritance pattern, cancer risks, cancer screening recommendations, and also risks, benefits and limitations of testing.  IHC testing of Carole's endometrial tumor showed absence of the following protein(s): MLH1 and PMS2. 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  It is less likely but also possible there could be a mutation in the PMS2 gene.    Therefore, it is recommended that Carole consider genetic blood testing to analyze the Walsh syndrome genes by sequencing and deletion/duplication analysis.        Walsh syndrome can be caused by a mutation in one of five genes:  MLH1, MSH2, MSH6, PMS2, and EPCAM.  Walsh syndrome may present with polyps, but typically a small number.  The highest cancer risks associated with Walsh syndrome include colon cancer, endometrial/uterine cancer, gastric cancer, and ovarian cancer.  Given her sister's history of early-onset breast cancer, we also discussed the BRCA1/2 genes. We reviewed that the most common cause of hereditary breast cancer is Hereditary Breast and Ovarian Cancer (HBOC) syndrome, which is caused by mutations in the genes BRCA1 and BRCA2. Women with a mutation in either of these genes are at increased risk for breast and ovarian cancer. There is also an increased risk for a second primary breast cancer for women. Men with a mutation in either BRCA1 or BRCA2 are at increased risk for male breast and prostate cancer. Both women and men may also be at increased risk for pancreatic cancer and melanoma.     Based on her personal and family history, Carole meets current National Comprehensive Cancer Network (NCCN) criteria for genetic testing of Walsh syndrome as well as high-penetrance breast cancer susceptibility genes (including BRCA1, BRCA2, CDH1, PALB2, PTEN, and TP53).      We discussed that there are additional genes that could cause increased risk for breast, gynecologic, and related cancers. As many of these genes present with overlapping features in a family and accurate cancer risk cannot always be established based upon the pedigree analysis alone, it would be reasonable for Carole to consider panel genetic testing to analyze multiple genes at  once.    We reviewed genetic testing options given Carole's personal and family history including targeted and expanded options. After our discussion, Carole opted to proceed with genetic testing via Walsh syndrome analysis with automatic reflex to the Common Hereditary Cancers panel through [facility].   Genetic testing is available for 47 genes associated with cancers of the breast, ovary, uterus, prostate and gastrointestinal system: [facility] Common Hereditary Cancers panel (APC, DALLAS, AXIN2, BARD1, BMPR1A, BRCA1, BRCA2, BRIP1, CDH1, CDK4, CDKN2A, CHEK2, CTNNA1, DICER1, EPCAM, GREM1, HOXB13, KIT, MEN1, MLH1, MSH2, MSH3, MSH6, MUTYH, NBN, NF1, NTHL1, PALB2, PDGFRA, PMS2, POLD1, POLE, PTEN,RAD50, RAD51C, RAD51D, SDHA, SDHB, SDHC, SDHD, SMAD4, SMARCA4, STK11, TP53,TSC1, TSC2, VHL).    We discussed that many of these genes are associated with specific hereditary cancer syndromes and published management guidelines: Hereditary Breast and Ovarian Cancer syndrome (BRCA1, BRCA2), Walsh syndrome (MLH1, MSH2, MSH6, PMS2, EPCAM), Familial Adenomatous Polyposis (APC), Hereditary Diffuse Gastric Cancer (CDH1), Familial Atypical Multiple Mole Melanoma syndrome (CDK4, CDKN2A), Multiple Endocrine Neoplasia type 1 (MEN1), Juvenile Polyposis syndrome (BMPR1A, SMAD4), Cowden syndrome (PTEN), Li Fraumeni syndrome (TP53), Hereditary Paraganglioma and Pheochromocytoma syndrome (SDHA, SDHB, SDHC, SDHD), Peutz-Jeghers syndrome (STK11), MUTYH Associated Polyposis (MUTYH), Tuberous sclerosis complex (TSC1, TSC2), Von Hippel-Lindau disease (VHL), and Neurofibromatosis type 1 (NF1).   The DALLAS, AXIN2, BRIP1, CHEK2, GREM1, MSH3, NBN, NTHL1, PALB2, POLD1, POLE, RAD51C, and RAD51D genes are associated with increased cancer risk and have published management guidelines for certain cancers.   The remaining genes (BARD1, CTNNA1, DICER1, HOXB13, KIT, PDGFRA, RAD50, and SMARCA4) are associated with increased cancer risk and may allow us to make medical  recommendations when mutations are identified.     Consent was obtained over the phone with no witness required due to the current covid19 global pandemic.    Medical Management: For Carole, we reviewed that the information from genetic testing may determine:    additional cancer screening for which Carole may qualify (i.e. mammogram and breast MRI, more frequent colonoscopies, more frequent dermatologic exams, etc.),    options for risk reducing surgeries Carole could consider (i.e. bilateral mastectomy, etc.),      and targeted chemotherapies for Carole if she were to develop certain cancers in the future (i.e. immunotherapy for individuals with Walsh syndrome, PARP inhibitors, etc.).     These recommendations will be discussed in detail once genetic testing is completed.     Carole opted to have a salivia kit sent to her home to complete the genetic testing. A kit was ordered from [employer] and a saliva collection kit will be sent to Carole's home address. I confirmed with Carole that the address we have on file is her current and correct address. We discussed some of the limitations of completing genetic testing via saliva and Carole was instructed to follow the instruction provided in the kit to ensure the best possibility of success for saliva genetic testing.     Plan:  1) Today Carole elected to proceed with Walsh syndrome analysis with automatic reflex to Common Hereditary Cancer panel genetic testing (47 genes) through [employer] [facility]  2) This information should be available in approximately 3 weeks.  3) Carole will be contacted by our scheduling department to set up a result disclosure appointment.     Karla Rolle MS, CGC  Licensed, Certified Genetic Counselor  [facility]  [email or web address]       skin suture

## 2023-02-16 NOTE — Clinical Note
Please send letter to patient's home address.    Karla Rolle MS, [facility]  Licensed, Certified Genetic Counselor  [facility]  [phone]  [email or web address]

## 2023-02-16 NOTE — PROGRESS NOTES
Elzbieta is a 67 year old who is being evaluated via a billable telephone visit.      What phone number would you like to be contacted at? [phone]  How would you like to obtain your AVS? Shanna  Distant Location (provider location):  Off-site  Phone call duration: 57 minutes    2/16/2023    Referring Provider: Hayden Patel MD    Present for Today's Visit: Carole    Presenting Information:   I met with Carole Grover today for genetic counseling (telephone visit due to covid19) to discuss her personal and family history of cancer.  She is here today to review this history, cancer screening recommendations, and available genetic testing options.    Personal History:  Carole is a 67 year old female. She was diagnosed with an uterine cancer in September 2022. She underwent supracervical hysterectomy with bilateral salpingectomy and sacral colposuspension due to pelvic organ prolapse.  Results from that surgery showed an incidental finding of T1a grade 1 endometrial cancer (endometrioid adenocarcinoma). She subsequently underwent a trachelectomy, bilateral oophorectomy-excision with Dr. Cummings and vaginal vault colposuspension with mesh by Dr. Hanks in urology. No further treatment was required. 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       She had her first menstrual period at age 16 or 17, her first child at age 22, and reports that she went through menopause in her late 50's. She reports past history of oral contraceptive use for about 5 years in her 20's and that she has never been on hormone replacement therapy.      Her most recent mammogram in February 2022 was normal and her breast density was reported as scattered fibroglandular densities. Most recent colonoscopy in 2018 was normal and follow-up was recommended in 10 years. She reports that her first colonoscopy in 2007 was normal as  well. She has not yet started colonoscopy screening given her age. She does not regularly do any other cancer screening at this time.  Carole reported no tobacco use, and about 1 drink per night for alcohol use.    Family History: Cancer family history and pertinent information reviewed below (Please see scanned pedigree for detailed family history information)    Brother, age 70, has a history of bladder cancer diagnosed at age 68. She reports that he was not a smoker but did work in a factory setting.     Sister, age 68, has a history of breast cancer diagnosed in her 20's (treatment included lumpectomy).     Niece with a history of a teratoma of the ovary at age 16.     There is no known Ashkenazi Hinduism ancestry on either side of her family. There is no reported consanguinity.    Discussion:    Carole's personal and family history of cancer is suggestive of a hereditary cancer syndrome.    We reviewed the features of sporadic, familial, and hereditary cancers. In looking at Carole's family history, it is possible that a cancer susceptibility gene is present due to her endometrial cancer that showed loss of MLH1 and PMS2 with normal MLH1 promoter hypermethylation as well as her sister's early-onset breast cancer diagnosis.    We discussed the natural history and genetics of hereditary cancers. A detailed handout regarding the information we discussed will be sent to Carole via [employer] and in [place] mail. Topics included: inheritance pattern, cancer risks, cancer screening recommendations, and also risks, benefits and limitations of testing.  IHC testing of Carole's endometrial tumor showed absence of the following protein(s): MLH1 and PMS2. 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  It is less likely but also possible there could be a mutation in the PMS2 gene.    Therefore, it is recommended that Carole consider genetic blood testing to analyze the Walsh  syndrome genes by sequencing and deletion/duplication analysis.        Walsh syndrome can be caused by a mutation in one of five genes:  MLH1, MSH2, MSH6, PMS2, and EPCAM.  Wlash syndrome may present with polyps, but typically a small number.  The highest cancer risks associated with Walsh syndrome include colon cancer, endometrial/uterine cancer, gastric cancer, and ovarian cancer.  Given her sister's history of early-onset breast cancer, we also discussed the BRCA1/2 genes. We reviewed that the most common cause of hereditary breast cancer is Hereditary Breast and Ovarian Cancer (HBOC) syndrome, which is caused by mutations in the genes BRCA1 and BRCA2. Women with a mutation in either of these genes are at increased risk for breast and ovarian cancer. There is also an increased risk for a second primary breast cancer for women. Men with a mutation in either BRCA1 or BRCA2 are at increased risk for male breast and prostate cancer. Both women and men may also be at increased risk for pancreatic cancer and melanoma.     Based on her personal and family history, Carole meets current National Comprehensive Cancer Network (NCCN) criteria for genetic testing of Walsh syndrome as well as high-penetrance breast cancer susceptibility genes (including BRCA1, BRCA2, CDH1, PALB2, PTEN, and TP53).      We discussed that there are additional genes that could cause increased risk for breast, gynecologic, and related cancers. As many of these genes present with overlapping features in a family and accurate cancer risk cannot always be established based upon the pedigree analysis alone, it would be reasonable for Carole to consider panel genetic testing to analyze multiple genes at once.    We reviewed genetic testing options given Carole's personal and family history including targeted and expanded options. After our discussion, Carole opted to proceed with genetic testing via Walsh syndrome analysis with automatic reflex to  the Common Hereditary Cancers panel through [facility].   Genetic testing is available for 47 genes associated with cancers of the breast, ovary, uterus, prostate and gastrointestinal system: [facility] Common Hereditary Cancers panel (APC, DALLAS, AXIN2, BARD1, BMPR1A, BRCA1, BRCA2, BRIP1, CDH1, CDK4, CDKN2A, CHEK2, CTNNA1, DICER1, EPCAM, GREM1, HOXB13, KIT, MEN1, MLH1, MSH2, MSH3, MSH6, MUTYH, NBN, NF1, NTHL1, PALB2, PDGFRA, PMS2, POLD1, POLE, PTEN,RAD50, RAD51C, RAD51D, SDHA, SDHB, SDHC, SDHD, SMAD4, SMARCA4, STK11, TP53,TSC1, TSC2, VHL).    We discussed that many of these genes are associated with specific hereditary cancer syndromes and published management guidelines: Hereditary Breast and Ovarian Cancer syndrome (BRCA1, BRCA2), Walsh syndrome (MLH1, MSH2, MSH6, PMS2, EPCAM), Familial Adenomatous Polyposis (APC), Hereditary Diffuse Gastric Cancer (CDH1), Familial Atypical Multiple Mole Melanoma syndrome (CDK4, CDKN2A), Multiple Endocrine Neoplasia type 1 (MEN1), Juvenile Polyposis syndrome (BMPR1A, SMAD4), Cowden syndrome (PTEN), Li Fraumeni syndrome (TP53), Hereditary Paraganglioma and Pheochromocytoma syndrome (SDHA, SDHB, SDHC, SDHD), Peutz-Jeghers syndrome (STK11), MUTYH Associated Polyposis (MUTYH), Tuberous sclerosis complex (TSC1, TSC2), Von Hippel-Lindau disease (VHL), and Neurofibromatosis type 1 (NF1).   The DALLAS, AXIN2, BRIP1, CHEK2, GREM1, MSH3, NBN, NTHL1, PALB2, POLD1, POLE, RAD51C, and RAD51D genes are associated with increased cancer risk and have published management guidelines for certain cancers.   The remaining genes (BARD1, CTNNA1, DICER1, HOXB13, KIT, PDGFRA, RAD50, and SMARCA4) are associated with increased cancer risk and may allow us to make medical recommendations when mutations are identified.     Consent was obtained over the phone with no witness required due to the current covid19 global pandemic.    Medical Management: For Carole, we reviewed that the information from genetic testing may  determine:    additional cancer screening for which Carole may qualify (i.e. mammogram and breast MRI, more frequent colonoscopies, more frequent dermatologic exams, etc.),    options for risk reducing surgeries Carole could consider (i.e. bilateral mastectomy, etc.),      and targeted chemotherapies for Carole if she were to develop certain cancers in the future (i.e. immunotherapy for individuals with Walsh syndrome, PARP inhibitors, etc.).     These recommendations will be discussed in detail once genetic testing is completed.     Carole opted to have a salivia kit sent to her home to complete the genetic testing. A kit was ordered from [employer] and a saliva collection kit will be sent to Carole's home address. I confirmed with Carole that the address we have on file is her current and correct address. We discussed some of the limitations of completing genetic testing via saliva and Carole was instructed to follow the instruction provided in the kit to ensure the best possibility of success for saliva genetic testing.     Plan:  1) Today Carole elected to proceed with Walsh syndrome analysis with automatic reflex to Common Hereditary Cancer panel genetic testing (47 genes) through [employer].  2) This information should be available in approximately 3 weeks.  3) Carole will be contacted by our scheduling department to set up a result disclosure appointment.     Karla Rolle MS, CGC  Licensed, Certified Genetic Counselor  [facility]  [email or web address]          .

## 2023-02-16 NOTE — NURSING NOTE
Is the patient currently in the state of [place]? YES    Visit mode:TELEPHONE    If the visit is dropped, the patient can be reconnected by: VIDEO VISIT: Text to cell phone: [phone]    Will anyone else be joining the visit? NO      How would you like to obtain your AVS? MyChart    Are changes needed to the allergy or medication list? NO    Comments or concerns regarding today's visit: New visit

## 2023-05-21 ENCOUNTER — HEALTH MAINTENANCE LETTER (OUTPATIENT)
Age: 68
End: 2023-05-21

## 2024-05-19 ENCOUNTER — HEALTH MAINTENANCE LETTER (OUTPATIENT)
Age: 69
End: 2024-05-19

## 2025-06-08 ENCOUNTER — HEALTH MAINTENANCE LETTER (OUTPATIENT)
Age: 70
End: 2025-06-08

## (undated) DEVICE — LIGHT HANDLE X1 31140133

## (undated) DEVICE — DRAPE SHEET REV FOLD 3/4 9349

## (undated) DEVICE — ESU GROUND PAD ADULT REM W/15' CORD E7507DB

## (undated) DEVICE — PREP SCRUB SOL EXIDINE 4% CHG 4OZ 29002-404

## (undated) DEVICE — GLOVE PROTEXIS W/NEU-THERA 6.5  2D73TE65

## (undated) DEVICE — ADH SKIN CLOSURE PREMIERPRO EXOFIN 1.0ML 3470

## (undated) DEVICE — PROTECTOR ARM ONE-STEP TRENDELENBURG 40418

## (undated) DEVICE — SU MONOCRYL 4-0 PS-2 27" UND Y426H

## (undated) DEVICE — KIT PATIENT POSITIONING PIGAZZI LATEX FREE 40580

## (undated) DEVICE — DAVINCI XI OBTURATOR BLADELESS 8MM 470359

## (undated) DEVICE — SU VICRYL 0 TIE 54" J608H

## (undated) DEVICE — DRSG PRIMAPORE 02X3" 7133

## (undated) DEVICE — SUCTION MANIFOLD NEPTUNE 2 SYS 4 PORT 0702-020-000

## (undated) DEVICE — ENDO TROCAR CONMED AIRSEAL BLADELESS 08X120MM IAS8-120LP

## (undated) DEVICE — LINEN TOWEL PACK X30 5481

## (undated) DEVICE — WIPES FOLEY CARE SURESTEP PROVON DFC100

## (undated) DEVICE — TUBING IRRIG CYSTO/BLADDER SET 81" LF 2C4040

## (undated) DEVICE — SU WND CLOSURE VLOC 180 ABS 0 9" GS-21 VLOCL0346

## (undated) DEVICE — NDL INSUFFLATION 13GA 120MM C2201

## (undated) DEVICE — LINEN GOWN XLG 5407

## (undated) DEVICE — LINEN TOWEL PACK X6 WHITE 5487

## (undated) DEVICE — SOL WATER IRRIG 1000ML BOTTLE 2F7114

## (undated) DEVICE — DAVINCI XI ESU BIPOLAR 3MM ENDOWRIST FENESTRATED EXT 471205

## (undated) DEVICE — PREP CHLORAPREP 26ML TINTED HI-LITE ORANGE 930815

## (undated) DEVICE — TUBING FILTER TRI-LUMEN AIRSEAL ASC-EVAC1

## (undated) DEVICE — Device

## (undated) DEVICE — SYSTEM LAPAROVUE VISIBILITY LAPVUE10

## (undated) DEVICE — ENDO TROCAR CONMED AIRSEAL BLADELESS 12X120MM IAS12-120LP

## (undated) DEVICE — NDL ANGIOCATH 14GA 1.25" 4048

## (undated) DEVICE — SUCTION IRR STRYKERFLOW II W/TIP 250-070-520

## (undated) DEVICE — DAVINCI XI MONOPOLAR SCISSORS HOT SHEARS 8MM 470179

## (undated) DEVICE — DAVINCI XI DRAPE ARM 470015

## (undated) DEVICE — SU SILK 3-0 TIE 12X30" A304H

## (undated) DEVICE — KOH COLPOTOMIZER OCCLUDER  CPO-6

## (undated) DEVICE — DAVINCI XI DRIVER NDL MEGA SUTURECUT 8MM EXT 471309

## (undated) DEVICE — GUIDEWIRE SENSOR DUAL FLEX STR 0.035"X150CM M0066703080

## (undated) DEVICE — CATH URETERAL OPEN END 05FR 70CM 020015

## (undated) DEVICE — JELLY LUBRICATING SURGILUBE 2OZ TUBE

## (undated) DEVICE — DECANTER VIAL 2006S

## (undated) DEVICE — DAVINCI XI DRAPE COLUMN 470341

## (undated) DEVICE — TUBING SUCTION 10'X3/16" N510

## (undated) DEVICE — DAVINCI XI GRASPER PROGRASP 8MM EXT 471093

## (undated) DEVICE — ENDO POUCH UNIVERSAL RETRIEVAL SYSTEM INZII 5MM CD003

## (undated) DEVICE — DAVINCI XI SEAL UNIVERSAL 5-8MM 470361

## (undated) DEVICE — LINEN TOWEL PACK X5 5464

## (undated) DEVICE — PACK GOWN 3/PK DISP XL SBA32GPFCB

## (undated) DEVICE — DRAPE MAYO STAND 23X54 8337

## (undated) DEVICE — GLOVE PROTEXIS BLUE W/NEU-THERA 7.0  2D73EB70

## (undated) DEVICE — SU PDS II 2-0 CT-2 27"  Z333H

## (undated) DEVICE — DAVINCI HOT SHEARS TIP COVER  400180

## (undated) RX ORDER — DEXAMETHASONE SODIUM PHOSPHATE 4 MG/ML
INJECTION, SOLUTION INTRA-ARTICULAR; INTRALESIONAL; INTRAMUSCULAR; INTRAVENOUS; SOFT TISSUE
Status: DISPENSED
Start: 2022-10-28

## (undated) RX ORDER — FENTANYL CITRATE-0.9 % NACL/PF 10 MCG/ML
PLASTIC BAG, INJECTION (ML) INTRAVENOUS
Status: DISPENSED
Start: 2022-10-28

## (undated) RX ORDER — HYDROMORPHONE HYDROCHLORIDE 1 MG/ML
INJECTION, SOLUTION INTRAMUSCULAR; INTRAVENOUS; SUBCUTANEOUS
Status: DISPENSED
Start: 2022-10-28

## (undated) RX ORDER — FENTANYL CITRATE 50 UG/ML
INJECTION, SOLUTION INTRAMUSCULAR; INTRAVENOUS
Status: DISPENSED
Start: 2022-10-28

## (undated) RX ORDER — ESMOLOL HYDROCHLORIDE 10 MG/ML
INJECTION INTRAVENOUS
Status: DISPENSED
Start: 2022-10-28

## (undated) RX ORDER — METRONIDAZOLE 500 MG/100ML
INJECTION, SOLUTION INTRAVENOUS
Status: DISPENSED
Start: 2022-10-28

## (undated) RX ORDER — PHENAZOPYRIDINE HYDROCHLORIDE 200 MG/1
TABLET, FILM COATED ORAL
Status: DISPENSED
Start: 2022-10-28

## (undated) RX ORDER — PROPOFOL 10 MG/ML
INJECTION, EMULSION INTRAVENOUS
Status: DISPENSED
Start: 2022-10-28

## (undated) RX ORDER — ONDANSETRON 2 MG/ML
INJECTION INTRAMUSCULAR; INTRAVENOUS
Status: DISPENSED
Start: 2022-10-28

## (undated) RX ORDER — LIDOCAINE HYDROCHLORIDE 20 MG/ML
INJECTION, SOLUTION EPIDURAL; INFILTRATION; INTRACAUDAL; PERINEURAL
Status: DISPENSED
Start: 2022-10-28

## (undated) RX ORDER — HEPARIN SODIUM 5000 [USP'U]/.5ML
INJECTION, SOLUTION INTRAVENOUS; SUBCUTANEOUS
Status: DISPENSED
Start: 2022-10-28

## (undated) RX ORDER — CEFAZOLIN SODIUM/WATER 2 G/20 ML
SYRINGE (ML) INTRAVENOUS
Status: DISPENSED
Start: 2022-10-28

## (undated) RX ORDER — EPHEDRINE SULFATE 50 MG/ML
INJECTION, SOLUTION INTRAMUSCULAR; INTRAVENOUS; SUBCUTANEOUS
Status: DISPENSED
Start: 2022-10-28

## (undated) RX ORDER — GLYCOPYRROLATE 0.2 MG/ML
INJECTION, SOLUTION INTRAMUSCULAR; INTRAVENOUS
Status: DISPENSED
Start: 2022-10-28

## (undated) RX ORDER — CEFAZOLIN SODIUM 1 G/3ML
INJECTION, POWDER, FOR SOLUTION INTRAMUSCULAR; INTRAVENOUS
Status: DISPENSED
Start: 2022-10-28